# Patient Record
Sex: FEMALE | Race: WHITE | Employment: FULL TIME | URBAN - NONMETROPOLITAN AREA
[De-identification: names, ages, dates, MRNs, and addresses within clinical notes are randomized per-mention and may not be internally consistent; named-entity substitution may affect disease eponyms.]

---

## 2017-11-14 ENCOUNTER — OFFICE VISIT (OUTPATIENT)
Dept: FAMILY MEDICINE CLINIC | Age: 22
End: 2017-11-14
Payer: MEDICAID

## 2017-11-14 VITALS
RESPIRATION RATE: 16 BRPM | BODY MASS INDEX: 23.52 KG/M2 | OXYGEN SATURATION: 99 % | HEART RATE: 92 BPM | SYSTOLIC BLOOD PRESSURE: 124 MMHG | WEIGHT: 132.8 LBS | DIASTOLIC BLOOD PRESSURE: 70 MMHG | TEMPERATURE: 98.1 F

## 2017-11-14 DIAGNOSIS — R30.0 DYSURIA: ICD-10-CM

## 2017-11-14 DIAGNOSIS — T78.40XA ALLERGIC REACTION, INITIAL ENCOUNTER: ICD-10-CM

## 2017-11-14 DIAGNOSIS — N30.01 ACUTE CYSTITIS WITH HEMATURIA: Primary | ICD-10-CM

## 2017-11-14 DIAGNOSIS — K59.00 CONSTIPATION, UNSPECIFIED CONSTIPATION TYPE: ICD-10-CM

## 2017-11-14 LAB
BACTERIA: ABNORMAL /HPF
BILIRUBIN URINE: NEGATIVE
BLOOD, URINE: ABNORMAL
CLARITY: CLEAR
COLOR: ABNORMAL
EPITHELIAL CELLS, UA: ABNORMAL /HPF
GLUCOSE URINE: 100 MG/DL
KETONES, URINE: NEGATIVE MG/DL
LEUKOCYTE ESTERASE, URINE: ABNORMAL
NITRITE, URINE: POSITIVE
PH UA: 5.5
PROTEIN UA: 30 MG/DL
RBC UA: ABNORMAL /HPF (ref 0–2)
SPECIFIC GRAVITY UA: <=1.005
URINE TYPE: ABNORMAL
UROBILINOGEN, URINE: 2 E.U./DL
WBC UA: ABNORMAL /HPF (ref 0–5)

## 2017-11-14 PROCEDURE — G8420 CALC BMI NORM PARAMETERS: HCPCS | Performed by: NURSE PRACTITIONER

## 2017-11-14 PROCEDURE — G8484 FLU IMMUNIZE NO ADMIN: HCPCS | Performed by: NURSE PRACTITIONER

## 2017-11-14 PROCEDURE — G8427 DOCREV CUR MEDS BY ELIG CLIN: HCPCS | Performed by: NURSE PRACTITIONER

## 2017-11-14 PROCEDURE — 99214 OFFICE O/P EST MOD 30 MIN: CPT | Performed by: NURSE PRACTITIONER

## 2017-11-14 PROCEDURE — 1036F TOBACCO NON-USER: CPT | Performed by: NURSE PRACTITIONER

## 2017-11-14 RX ORDER — PHENAZOPYRIDINE HYDROCHLORIDE 200 MG/1
200 TABLET, FILM COATED ORAL 3 TIMES DAILY PRN
Qty: 30 TABLET | Refills: 0 | Status: SHIPPED | OUTPATIENT
Start: 2017-11-14 | End: 2017-11-17

## 2017-11-14 RX ORDER — EPINEPHRINE 0.3 MG/.3ML
0.3 INJECTION SUBCUTANEOUS ONCE
Qty: 0.3 ML | Refills: 5 | Status: SHIPPED | OUTPATIENT
Start: 2017-11-14 | End: 2019-04-02

## 2017-11-14 RX ORDER — SULFAMETHOXAZOLE AND TRIMETHOPRIM 800; 160 MG/1; MG/1
1 TABLET ORAL 2 TIMES DAILY
Qty: 20 TABLET | Refills: 0 | Status: SHIPPED | OUTPATIENT
Start: 2017-11-14 | End: 2017-11-24

## 2017-11-14 RX ORDER — ALBUTEROL SULFATE 90 UG/1
2 AEROSOL, METERED RESPIRATORY (INHALATION) EVERY 6 HOURS PRN
Qty: 1 INHALER | Refills: 0 | Status: SHIPPED | OUTPATIENT
Start: 2017-11-14 | End: 2019-05-30 | Stop reason: ALTCHOICE

## 2017-11-14 ASSESSMENT — ENCOUNTER SYMPTOMS: CONSTIPATION: 1

## 2017-11-14 NOTE — PROGRESS NOTES
Subjective:    Here today for burning  On urination. Patient ID: Trina Chavez is a 23 y.o. female. Chief Complaint   Patient presents with    Urinary Tract Infection     started yesterday. started on antibiotic last night       HPI  Subjective:      Trina Chavez is a 23 y.o. female who complains of dysuria, frequency, urgency, constipation for 1 day. Patient also complains of rhinitis. Patient denies congestion, cough and fever. Patient does have a history of recurrent UTI. Patient does not have a history of pyelonephritis. Patient's medications, allergies, past medical, surgical, social and family histories were reviewed and updated as appropriate. Review of Systems  Pertinent items are noted in HPI. Constipation  Patient complains of constipation. Onset was several years ago. Patient has been having occasional firm stools per week. Defecation has been difficult. Co-Morbid conditions:has changed diest with some improvement. Symptoms have waxed and waned. Current Health Habits: Eating fiber? yes - , Exercise? yes - , Adequate hydration? yes - . Current over the counter/prescription laxative: no which has been not very effective. Objective:      /70 (Site: Right Arm, Position: Sitting, Cuff Size: Medium Adult)   Pulse 92   Temp 98.1 °F (36.7 °C) (Oral)   Resp 16   Wt 132 lb 12.8 oz (60.2 kg)   LMP 11/07/2017 (Approximate)   SpO2 99%   BMI 23.52 kg/m²   General: alert, appears stated age and cooperative   Abdomen: soft, non-tender, without masses or organomegaly in the lower abdomen   Back: back muscles are normal   : defer exam     Laboratory:   Urine dipstick shows see labs.     Orders Only on 11/14/2017   Component Date Value Ref Range Status    Color, UA 11/14/2017 PERRY* Straw/Yellow Final    Clarity, UA 11/14/2017 Clear  Clear Final    Glucose, Ur 11/14/2017 100* Negative mg/dL Final    Bilirubin Urine 11/14/2017 Negative  Negative Final    Ketones, Urine 11/14/2017 tablet    phenazopyridine (PYRIDIUM) 200 MG tablet   2. Dysuria  Urinalysis   3. Allergic reaction, initial encounter  EPINEPHrine (EPIPEN 2-RIVAS) 0.3 MG/0.3ML SOAJ injection    albuterol sulfate HFA (PROAIR HFA) 108 (90 Base) MCG/ACT inhaler   4.  Constipation, unspecified constipation type  linaclotide (LINZESS) 290 MCG CAPS capsule           Plan:    RTC in 10 days to eval urine

## 2017-11-16 LAB
ORGANISM: ABNORMAL
URINE CULTURE, ROUTINE: ABNORMAL
URINE CULTURE, ROUTINE: ABNORMAL

## 2017-11-16 RX ORDER — LEVOFLOXACIN 500 MG/1
500 TABLET, FILM COATED ORAL DAILY
Qty: 7 TABLET | Refills: 0 | Status: SHIPPED | OUTPATIENT
Start: 2017-11-16 | End: 2017-11-23

## 2017-11-27 ENCOUNTER — OFFICE VISIT (OUTPATIENT)
Dept: FAMILY MEDICINE CLINIC | Age: 22
End: 2017-11-27
Payer: MEDICAID

## 2017-11-27 VITALS
OXYGEN SATURATION: 98 % | BODY MASS INDEX: 21.79 KG/M2 | HEART RATE: 104 BPM | WEIGHT: 123 LBS | RESPIRATION RATE: 20 BRPM | DIASTOLIC BLOOD PRESSURE: 76 MMHG | TEMPERATURE: 97.6 F | SYSTOLIC BLOOD PRESSURE: 118 MMHG

## 2017-11-27 DIAGNOSIS — N76.0 BACTERIAL VAGINITIS: Primary | ICD-10-CM

## 2017-11-27 DIAGNOSIS — B96.89 BACTERIAL VAGINITIS: Primary | ICD-10-CM

## 2017-11-27 DIAGNOSIS — Z30.09 FAMILY PLANNING: ICD-10-CM

## 2017-11-27 DIAGNOSIS — N30.01 ACUTE CYSTITIS WITH HEMATURIA: ICD-10-CM

## 2017-11-27 PROCEDURE — 99214 OFFICE O/P EST MOD 30 MIN: CPT | Performed by: NURSE PRACTITIONER

## 2017-11-27 PROCEDURE — G8427 DOCREV CUR MEDS BY ELIG CLIN: HCPCS | Performed by: NURSE PRACTITIONER

## 2017-11-27 PROCEDURE — 1036F TOBACCO NON-USER: CPT | Performed by: NURSE PRACTITIONER

## 2017-11-27 PROCEDURE — G8484 FLU IMMUNIZE NO ADMIN: HCPCS | Performed by: NURSE PRACTITIONER

## 2017-11-27 PROCEDURE — G8420 CALC BMI NORM PARAMETERS: HCPCS | Performed by: NURSE PRACTITIONER

## 2017-11-27 RX ORDER — METRONIDAZOLE 500 MG/1
500 TABLET ORAL 3 TIMES DAILY
Qty: 21 TABLET | Refills: 0 | Status: SHIPPED | OUTPATIENT
Start: 2017-11-27 | End: 2017-12-04

## 2017-11-27 RX ORDER — LEVOFLOXACIN 500 MG/1
500 TABLET, FILM COATED ORAL DAILY
Qty: 7 TABLET | Refills: 0 | Status: SHIPPED | OUTPATIENT
Start: 2017-11-27 | End: 2017-12-04

## 2017-11-27 RX ORDER — NORGESTIMATE AND ETHINYL ESTRADIOL 7DAYSX3 28
1 KIT ORAL DAILY
Qty: 28 TABLET | Refills: 5 | Status: SHIPPED | OUTPATIENT
Start: 2017-11-27 | End: 2019-05-30 | Stop reason: SDUPTHER

## 2017-11-29 DIAGNOSIS — B96.89 BACTERIAL VAGINITIS: ICD-10-CM

## 2017-11-29 DIAGNOSIS — N76.0 BACTERIAL VAGINITIS: ICD-10-CM

## 2017-12-01 LAB
APTIMA MEDIA TYPE: NORMAL
CHLAMYDIA TRACHOMATIS AMPLIFIED DET: NEGATIVE
N GONORRHOEAE AMPLIFIED DET: NEGATIVE
SPECIMEN SOURCE: NORMAL

## 2017-12-12 DIAGNOSIS — N39.0 URINARY TRACT INFECTION WITHOUT HEMATURIA, SITE UNSPECIFIED: Primary | ICD-10-CM

## 2017-12-12 DIAGNOSIS — N39.0 URINARY TRACT INFECTION WITHOUT HEMATURIA, SITE UNSPECIFIED: ICD-10-CM

## 2017-12-12 LAB
BACTERIA: NEGATIVE /HPF
BILIRUBIN URINE: NEGATIVE
BLOOD, URINE: ABNORMAL
CLARITY: CLEAR
COLOR: YELLOW
EPITHELIAL CELLS, UA: 1 /HPF (ref 0–5)
GLUCOSE URINE: NEGATIVE MG/DL
HYALINE CASTS: 1 /HPF (ref 0–8)
KETONES, URINE: NEGATIVE MG/DL
LEUKOCYTE ESTERASE, URINE: NEGATIVE
NITRITE, URINE: NEGATIVE
PH UA: 6
PROTEIN UA: NEGATIVE MG/DL
RBC UA: 4 /HPF (ref 0–4)
SPECIFIC GRAVITY UA: 1.02
UROBILINOGEN, URINE: 0.2 E.U./DL
WBC UA: 1 /HPF (ref 0–5)

## 2017-12-15 ENCOUNTER — OFFICE VISIT (OUTPATIENT)
Dept: FAMILY MEDICINE CLINIC | Age: 22
End: 2017-12-15
Payer: MEDICAID

## 2017-12-15 VITALS
OXYGEN SATURATION: 98 % | HEART RATE: 75 BPM | RESPIRATION RATE: 16 BRPM | DIASTOLIC BLOOD PRESSURE: 64 MMHG | WEIGHT: 130.2 LBS | BODY MASS INDEX: 23.06 KG/M2 | TEMPERATURE: 98.2 F | SYSTOLIC BLOOD PRESSURE: 114 MMHG

## 2017-12-15 DIAGNOSIS — R31.9 HEMATURIA, UNSPECIFIED TYPE: Primary | ICD-10-CM

## 2017-12-15 DIAGNOSIS — K92.1 BLOOD IN STOOL: ICD-10-CM

## 2017-12-15 DIAGNOSIS — R31.9 HEMATURIA, UNSPECIFIED TYPE: ICD-10-CM

## 2017-12-15 LAB
BACTERIA: NEGATIVE /HPF
BILIRUBIN URINE: NEGATIVE
BLOOD, URINE: ABNORMAL
CLARITY: CLEAR
COLOR: YELLOW
EPITHELIAL CELLS, UA: 1 /HPF (ref 0–5)
GLUCOSE URINE: NEGATIVE MG/DL
HYALINE CASTS: 2 /HPF (ref 0–8)
KETONES, URINE: NEGATIVE MG/DL
LEUKOCYTE ESTERASE, URINE: NEGATIVE
NITRITE, URINE: NEGATIVE
PH UA: 7
PROTEIN UA: NEGATIVE MG/DL
RBC UA: 122 /HPF (ref 0–4)
SPECIFIC GRAVITY UA: 1.02
URINE TYPE: ABNORMAL
UROBILINOGEN, URINE: 0.2 E.U./DL
WBC UA: 1 /HPF (ref 0–5)

## 2017-12-15 PROCEDURE — G8420 CALC BMI NORM PARAMETERS: HCPCS | Performed by: NURSE PRACTITIONER

## 2017-12-15 PROCEDURE — 1036F TOBACCO NON-USER: CPT | Performed by: NURSE PRACTITIONER

## 2017-12-15 PROCEDURE — G8484 FLU IMMUNIZE NO ADMIN: HCPCS | Performed by: NURSE PRACTITIONER

## 2017-12-15 PROCEDURE — 99213 OFFICE O/P EST LOW 20 MIN: CPT | Performed by: NURSE PRACTITIONER

## 2017-12-15 PROCEDURE — G8427 DOCREV CUR MEDS BY ELIG CLIN: HCPCS | Performed by: NURSE PRACTITIONER

## 2017-12-15 ASSESSMENT — ENCOUNTER SYMPTOMS
SORE THROAT: 1
BLOOD IN STOOL: 1

## 2017-12-15 NOTE — PROGRESS NOTES
Subjective:      Patient ID: Megan Matta is a 23 y.o. female. Chief Complaint   Patient presents with    Hematuria    Rectal Bleeding     states has a lot of bright red blood when has BM x 2 weeks    Pharyngitis     started this am   pt states has appointment with Dr. Juanis Wilkins in 01/2018    HPI  Pt has recurrent UTI that she sees barbara for. She was recently treated with abx and cleared a UTI, now she has some burning returning. She also reports mild sore throat, no fever. She says that some people at work have sick kids at home and she is afraid they will bring their germs to work. She also reports intermittent rectal bleeding. This doesn't occur with every BM, only sporadically over the last two weeks. No fever, no weight loss, no diarrhea. Review of Systems   HENT: Positive for sore throat. Gastrointestinal: Positive for blood in stool. Genitourinary: Negative for dysuria. No Known Allergies  Current Outpatient Prescriptions on File Prior to Visit   Medication Sig Dispense Refill    Norgestim-Eth Estrad Triphasic (ORTHO TRI-CYCLEN, 28,) 0.18/0.215/0.25 MG-35 MCG TABS Take 1 tablet by mouth daily 28 tablet 5    albuterol sulfate HFA (PROAIR HFA) 108 (90 Base) MCG/ACT inhaler Inhale 2 puffs into the lungs every 6 hours as needed for Wheezing 1 Inhaler 0    EPINEPHrine (EPIPEN 2-RIVAS) 0.3 MG/0.3ML SOAJ injection Inject 0.3 mLs into the muscle once for 1 dose Use as directed for allergic reaction 0.3 mL 5    linaclotide (LINZESS) 290 MCG CAPS capsule Take 1 capsule by mouth every morning (before breakfast) 30 capsule 5     No current facility-administered medications on file prior to visit. No past medical history on file. Objective:   Physical Exam   Constitutional: She is oriented to person, place, and time. She appears well-developed and well-nourished. No distress. HENT:   Head: Normocephalic.    Right Ear: External ear normal.   Left Ear: External ear normal.   Nose: Nose normal.   Mouth/Throat: Oropharynx is clear and moist. No oropharyngeal exudate. Eyes: Conjunctivae are normal. Pupils are equal, round, and reactive to light. Neck: Normal range of motion. Neck supple. Cardiovascular: Normal rate, regular rhythm and normal heart sounds. Pulmonary/Chest: Effort normal and breath sounds normal. No respiratory distress. Neurological: She is alert and oriented to person, place, and time. Skin: Skin is warm and dry. She is not diaphoretic. Psychiatric: Her behavior is normal. Thought content normal.   Nursing note and vitals reviewed. /64 (Site: Right Arm, Position: Sitting, Cuff Size: Medium Adult)   Pulse 75   Temp 98.2 °F (36.8 °C) (Oral)   Resp 16   Wt 130 lb 3.2 oz (59.1 kg)   LMP 11/15/2017   SpO2 98%   BMI 23.06 kg/m²     Assessment:      1. Hematuria, unspecified type  Urinalysis   2. Hematochezia  Culture Stool    Blood Occult Stool Screen #1           Plan:      Keep follow up appt with Michelle next month to address the hematuria. Awaiting urine culture. Stool studies ordered. Refer to GI.

## 2017-12-26 ENCOUNTER — TELEPHONE (OUTPATIENT)
Dept: FAMILY MEDICINE CLINIC | Age: 22
End: 2017-12-26

## 2017-12-26 NOTE — TELEPHONE ENCOUNTER
Patient called on Friday, call was transferred to my voice mail, but I was out of the office. She states sore throat has worsened since seeing Connor Morales and was requesting antibiotics. She has not be swabbed for flu or strep, I have left voice mail requesting she call and set up an appointment if she is still feeling bad.

## 2018-05-21 ENCOUNTER — OFFICE VISIT (OUTPATIENT)
Dept: FAMILY MEDICINE CLINIC | Age: 23
End: 2018-05-21
Payer: MEDICAID

## 2018-05-21 VITALS
OXYGEN SATURATION: 98 % | BODY MASS INDEX: 23 KG/M2 | RESPIRATION RATE: 20 BRPM | SYSTOLIC BLOOD PRESSURE: 118 MMHG | HEART RATE: 80 BPM | TEMPERATURE: 98 F | DIASTOLIC BLOOD PRESSURE: 76 MMHG | WEIGHT: 125 LBS | HEIGHT: 62 IN

## 2018-05-21 DIAGNOSIS — K62.5 RECTAL BLEEDING: Primary | ICD-10-CM

## 2018-05-21 PROCEDURE — G8427 DOCREV CUR MEDS BY ELIG CLIN: HCPCS | Performed by: NURSE PRACTITIONER

## 2018-05-21 PROCEDURE — 99213 OFFICE O/P EST LOW 20 MIN: CPT | Performed by: NURSE PRACTITIONER

## 2018-05-21 PROCEDURE — G8420 CALC BMI NORM PARAMETERS: HCPCS | Performed by: NURSE PRACTITIONER

## 2018-05-21 PROCEDURE — 1036F TOBACCO NON-USER: CPT | Performed by: NURSE PRACTITIONER

## 2018-05-21 ASSESSMENT — ENCOUNTER SYMPTOMS
BLOOD IN STOOL: 1
ABDOMINAL PAIN: 1
HEMATOCHEZIA: 1
CONSTIPATION: 1

## 2018-05-21 ASSESSMENT — PATIENT HEALTH QUESTIONNAIRE - PHQ9
SUM OF ALL RESPONSES TO PHQ QUESTIONS 1-9: 0
2. FEELING DOWN, DEPRESSED OR HOPELESS: 0
1. LITTLE INTEREST OR PLEASURE IN DOING THINGS: 0
SUM OF ALL RESPONSES TO PHQ9 QUESTIONS 1 & 2: 0

## 2018-06-14 ENCOUNTER — OFFICE VISIT (OUTPATIENT)
Dept: GASTROENTEROLOGY | Age: 23
End: 2018-06-14
Payer: MEDICAID

## 2018-06-14 VITALS
DIASTOLIC BLOOD PRESSURE: 82 MMHG | HEIGHT: 62 IN | RESPIRATION RATE: 18 BRPM | BODY MASS INDEX: 23.74 KG/M2 | HEART RATE: 92 BPM | WEIGHT: 129 LBS | SYSTOLIC BLOOD PRESSURE: 122 MMHG

## 2018-06-14 DIAGNOSIS — K62.5 RECTAL BLEEDING: Primary | ICD-10-CM

## 2018-06-14 DIAGNOSIS — K58.1 IRRITABLE BOWEL SYNDROME WITH CONSTIPATION: ICD-10-CM

## 2018-06-14 DIAGNOSIS — R10.30 LOWER ABDOMINAL PAIN: ICD-10-CM

## 2018-06-14 DIAGNOSIS — K62.89 RECTAL PAIN: ICD-10-CM

## 2018-06-14 PROCEDURE — G8427 DOCREV CUR MEDS BY ELIG CLIN: HCPCS | Performed by: NURSE PRACTITIONER

## 2018-06-14 PROCEDURE — 4004F PT TOBACCO SCREEN RCVD TLK: CPT | Performed by: NURSE PRACTITIONER

## 2018-06-14 PROCEDURE — 99214 OFFICE O/P EST MOD 30 MIN: CPT | Performed by: NURSE PRACTITIONER

## 2018-06-14 PROCEDURE — G8420 CALC BMI NORM PARAMETERS: HCPCS | Performed by: NURSE PRACTITIONER

## 2018-06-14 ASSESSMENT — ENCOUNTER SYMPTOMS
CONSTIPATION: 0
BLOOD IN STOOL: 0
COUGH: 0
SHORTNESS OF BREATH: 0
NAUSEA: 0
CHEST TIGHTNESS: 0
ABDOMINAL PAIN: 1
ABDOMINAL DISTENTION: 0
RECTAL PAIN: 0
ANAL BLEEDING: 1
VOMITING: 0
BACK PAIN: 0
VOICE CHANGE: 0
DIARRHEA: 0
SORE THROAT: 0

## 2018-08-15 ENCOUNTER — OFFICE VISIT (OUTPATIENT)
Dept: FAMILY MEDICINE CLINIC | Age: 23
End: 2018-08-15
Payer: MEDICAID

## 2018-08-15 VITALS
WEIGHT: 136 LBS | BODY MASS INDEX: 24.87 KG/M2 | HEART RATE: 70 BPM | RESPIRATION RATE: 16 BRPM | OXYGEN SATURATION: 99 % | SYSTOLIC BLOOD PRESSURE: 134 MMHG | TEMPERATURE: 98.5 F | DIASTOLIC BLOOD PRESSURE: 80 MMHG

## 2018-08-15 DIAGNOSIS — N92.6 ABNORMAL MENSES: ICD-10-CM

## 2018-08-15 DIAGNOSIS — R30.0 DYSURIA: ICD-10-CM

## 2018-08-15 DIAGNOSIS — B36.0 TINEA VERSICOLOR: Primary | ICD-10-CM

## 2018-08-15 LAB
BACTERIA: NEGATIVE /HPF
BILIRUBIN URINE: NEGATIVE
BLOOD, URINE: ABNORMAL
CLARITY: ABNORMAL
COLOR: YELLOW
EPITHELIAL CELLS, UA: 3 /HPF (ref 0–5)
GLUCOSE URINE: NEGATIVE MG/DL
HCG(URINE) PREGNANCY TEST: NEGATIVE
HYALINE CASTS: 2 /HPF (ref 0–8)
KETONES, URINE: ABNORMAL MG/DL
LEUKOCYTE ESTERASE, URINE: NEGATIVE
NITRITE, URINE: NEGATIVE
PH UA: 7
PROTEIN UA: ABNORMAL MG/DL
RBC UA: 178 /HPF (ref 0–4)
SPECIFIC GRAVITY UA: 1.02
URINE TYPE: ABNORMAL
UROBILINOGEN, URINE: 0.2 E.U./DL
WBC UA: 2 /HPF (ref 0–5)

## 2018-08-15 PROCEDURE — 99213 OFFICE O/P EST LOW 20 MIN: CPT | Performed by: NURSE PRACTITIONER

## 2018-08-15 PROCEDURE — G8427 DOCREV CUR MEDS BY ELIG CLIN: HCPCS | Performed by: NURSE PRACTITIONER

## 2018-08-15 PROCEDURE — 1036F TOBACCO NON-USER: CPT | Performed by: NURSE PRACTITIONER

## 2018-08-15 PROCEDURE — G8420 CALC BMI NORM PARAMETERS: HCPCS | Performed by: NURSE PRACTITIONER

## 2018-08-15 RX ORDER — KETOCONAZOLE 20 MG/ML
SHAMPOO TOPICAL
Qty: 100 ML | Refills: 1 | Status: SHIPPED | OUTPATIENT
Start: 2018-08-15 | End: 2019-04-02 | Stop reason: ALTCHOICE

## 2018-08-15 RX ORDER — FLUCONAZOLE 150 MG/1
TABLET ORAL
Qty: 4 TABLET | Refills: 0 | Status: SHIPPED | OUTPATIENT
Start: 2018-08-15 | End: 2019-05-30 | Stop reason: ALTCHOICE

## 2018-08-15 ASSESSMENT — ENCOUNTER SYMPTOMS: COLOR CHANGE: 1

## 2018-08-15 NOTE — PROGRESS NOTES
[address] [address]  Dept: [phone]  Dept Fax: [phone]  Loc: [phone]    Dwight Johnson is a 23 y.o. female who presents today for her medical conditions/complaints as noted below. Dwight Johnson is c/o of Dysuria (x 2 days) and Other (skin discoloration x 2 weeks)        HPI:     HPI  Pt reports discolorations all over her chest arms and abdomen. This started two weeks ago and is getting gworse. Her boyfriend has identical symptoms. She also has intermittent dysuria and spotting while taking birth control. She wants pregnancy test.   She has tried over the counter selsun blue and athlete's foot cream with no results. History reviewed. No pertinent past medical history.    Past Surgical History:   Procedure Laterality Date    ANTERIOR CRUCIATE LIGAMENT REPAIR      rt    TONSILLECTOMY         Family History   Problem Relation Age of Onset    Other Mother         anxiety     Other Father         anger     Colon Cancer Neg Hx     Colon Polyps Neg Hx     Esophageal Cancer Neg Hx     Liver Cancer Neg Hx     Liver Disease Neg Hx     Stomach Cancer Neg Hx     Rectal Cancer Neg Hx        Social History   Substance Use Topics    Smoking status: Former Smoker     Quit date: 8/8/2018    Smokeless tobacco: Never Used    Alcohol use No      Current Outpatient Prescriptions   Medication Sig Dispense Refill    fluconazole (DIFLUCAN) 150 MG tablet Take two tablets once weekly for two weeks 4 tablet 0    ketoconazole (NIZORAL) 2 % shampoo The shampoo is applied to affected areas and is washed off after five minutes 100 mL 1    Norgestim-Eth Estrad Triphasic (ORTHO TRI-CYCLEN, 28,) 0.18/0.215/0.25 MG-35 MCG TABS Take 1 tablet by mouth daily 28 tablet 5    albuterol sulfate HFA (PROAIR HFA) 108 (90 Base) MCG/ACT inhaler Inhale 2 puffs into the lungs every 6 hours as needed for Wheezing 1 Inhaler 0    hydrocortisone (ANUSOL-HC) 2.5 % rectal cream Place rectally 2 times daily. 1 Tube 0    EPINEPHrine (EPIPEN 2-RIVAS) 0.3 MG/0.3ML SOAJ injection Inject 0.3 mLs into the muscle once for 1 dose Use as directed for allergic reaction 0.3 mL 5     No current facility-administered medications for this visit. No Known Allergies    Health Maintenance   Topic Date Due    HIV screen  10/31/2012    Meningococcal (MCV) Vaccine Age 0-22 Years (1 of 1) 10/31/2013    DTaP/Tdap/Td vaccine (1 - Tdap) 10/31/2016    Pneumococcal med risk (1 of 1 - PPSV23) 10/31/2016    Flu vaccine (1) 09/01/2018    Chlamydia screen  11/29/2018       Subjective:      Review of Systems   Genitourinary: Positive for menstrual problem. Skin: Positive for color change. Objective:     Physical Exam   Constitutional: She is oriented to person, place, and time. She appears well-developed and well-nourished. Neurological: She is alert and oriented to person, place, and time. Skin: Skin is warm and dry. Widespread decreased pigmentation lesions on upper chest, bilateral arms and back. Nursing note and vitals reviewed. /80 (Site: Right Arm, Position: Sitting, Cuff Size: Medium Adult)   Pulse 70   Temp 98.5 °F (36.9 °C) (Oral)   Resp 16   Wt 136 lb (61.7 kg)   LMP 07/31/2018 (Approximate)   SpO2 99%   BMI 24.87 kg/m²     Assessment:       Diagnosis Orders   1. Tinea versicolor  ketoconazole (NIZORAL) 2 % shampoo    [facility]- Lynne Zavaleta MD   2. Dysuria  Urinalysis    Urine Culture   3. Abnormal menses  Pregnancy, Urine       Plan:      Orders Placed This Encounter   Procedures    Urine Culture     Standing Status:   Future     Standing Expiration Date:   8/15/2019     Order Specific Question:   Specify (ex-cath, midstream, cysto, etc)?      Answer:   Midstream    Urinalysis     Standing Status:   Future     Number of Occurrences:   1     Standing Expiration Date:   8/15/2019     Order Specific Question:

## 2018-08-15 NOTE — PATIENT INSTRUCTIONS
clothes in very hot water to kill the yeast.  When should you call for help? Call your doctor now or seek immediate medical care if:    · You have signs of infection such as:  ¨ Pain, warmth, or swelling in your skin. ¨ Red streaks near a wound in your skin. ¨ Pus coming from a wound in your skin. ¨ A fever.    Watch closely for changes in your health, and be sure to contact your doctor if:    · Your skin condition does not improve in 2 weeks.     · You do not get better as expected. Where can you learn more? Go to [email or web address] and sign in to your [employer] account. Enter C969 in the [employer] box to learn more about \"Tinea Versicolor: Care Instructions. \"     If you do not have an account, please click on the \"Sign Up Now\" link. Current as of: October 5, 2017  Content Version: 11.7  © 1974-2527 [employer]. Care instructions adapted under license by [facility] ([facility]). If you have questions about a medical condition or this instruction, always ask your healthcare professional. [place] [address] any warranty or liability for your use of this information. Patient Education        Painful Urination (Dysuria): Care Instructions  Your Care Instructions  Burning pain with urination (dysuria) is a common symptom of a urinary tract infection or other urinary problems. The bladder may become inflamed. This can cause pain when the bladder fills and empties. You may also feel pain if the tube that carries urine from the bladder to the outside of the body (urethra) gets irritated or infected. Sexually transmitted infections (STIs) also may cause pain when you urinate. Sometimes the pain can be caused by things other than an infection. The urethra can be irritated by soaps, perfumes, or foreign objects in the urethra. Kidney stones can cause pain when they pass through the urethra. The cause may be hard to find. You may need tests.  Treatment for

## 2019-04-02 ENCOUNTER — OFFICE VISIT (OUTPATIENT)
Dept: FAMILY MEDICINE CLINIC | Age: 24
End: 2019-04-02
Payer: COMMERCIAL

## 2019-04-02 VITALS
DIASTOLIC BLOOD PRESSURE: 72 MMHG | SYSTOLIC BLOOD PRESSURE: 118 MMHG | OXYGEN SATURATION: 98 % | BODY MASS INDEX: 25.79 KG/M2 | TEMPERATURE: 98.2 F | WEIGHT: 141 LBS | HEART RATE: 70 BPM | RESPIRATION RATE: 20 BRPM

## 2019-04-02 DIAGNOSIS — N30.01 ACUTE CYSTITIS WITH HEMATURIA: ICD-10-CM

## 2019-04-02 DIAGNOSIS — R31.0 GROSS HEMATURIA: Primary | ICD-10-CM

## 2019-04-02 DIAGNOSIS — R31.0 GROSS HEMATURIA: ICD-10-CM

## 2019-04-02 DIAGNOSIS — T88.7XXA MEDICATION SIDE EFFECT: ICD-10-CM

## 2019-04-02 LAB
BACTERIA: ABNORMAL /HPF
BILIRUBIN URINE: NEGATIVE
BLOOD, URINE: ABNORMAL
CLARITY: CLEAR
COLOR: YELLOW
EPITHELIAL CELLS, UA: ABNORMAL /HPF
GLUCOSE URINE: NEGATIVE MG/DL
KETONES, URINE: NEGATIVE MG/DL
LEUKOCYTE ESTERASE, URINE: ABNORMAL
NITRITE, URINE: POSITIVE
PH UA: 7 (ref 5–8)
PROTEIN UA: NEGATIVE MG/DL
RBC UA: ABNORMAL /HPF (ref 0–2)
SPECIFIC GRAVITY UA: 1.01 (ref 1–1.03)
URINE REFLEX TO CULTURE: YES
URINE TYPE: ABNORMAL
UROBILINOGEN, URINE: 0.2 E.U./DL
WBC UA: ABNORMAL /HPF (ref 0–5)

## 2019-04-02 PROCEDURE — 99213 OFFICE O/P EST LOW 20 MIN: CPT | Performed by: NURSE PRACTITIONER

## 2019-04-02 RX ORDER — FLUCONAZOLE 150 MG/1
150 TABLET ORAL ONCE
Qty: 1 TABLET | Refills: 0 | Status: SHIPPED | OUTPATIENT
Start: 2019-04-02 | End: 2019-04-02

## 2019-04-02 RX ORDER — SULFAMETHOXAZOLE AND TRIMETHOPRIM 800; 160 MG/1; MG/1
1 TABLET ORAL 2 TIMES DAILY
Qty: 20 TABLET | Refills: 0 | Status: SHIPPED | OUTPATIENT
Start: 2019-04-02 | End: 2019-04-04 | Stop reason: ALTCHOICE

## 2019-04-02 RX ORDER — PHENAZOPYRIDINE HYDROCHLORIDE 200 MG/1
200 TABLET, FILM COATED ORAL 3 TIMES DAILY PRN
Qty: 10 TABLET | Refills: 0 | Status: SHIPPED | OUTPATIENT
Start: 2019-04-02 | End: 2019-04-05

## 2019-04-02 ASSESSMENT — PATIENT HEALTH QUESTIONNAIRE - PHQ9
SUM OF ALL RESPONSES TO PHQ QUESTIONS 1-9: 0
SUM OF ALL RESPONSES TO PHQ QUESTIONS 1-9: 0
SUM OF ALL RESPONSES TO PHQ9 QUESTIONS 1 & 2: 0
1. LITTLE INTEREST OR PLEASURE IN DOING THINGS: 0
2. FEELING DOWN, DEPRESSED OR HOPELESS: 0

## 2019-04-02 NOTE — PROGRESS NOTES
SUBJECTIVE:  Here today for bladder pain  Patient ID: Marcie Abernathy is a 26 y.o. female. HPI:   Dysuria    This is a new problem. The current episode started today. The problem has been gradually worsening. The pain is moderate. Associated symptoms include flank pain and frequency. Pertinent negatives include no hematuria. Treatments tried: AZO. The treatment provided mild relief. Her past medical history is significant for recurrent UTIs. Orders Only on 04/02/2019   Component Date Value Ref Range Status    Color, UA 04/02/2019 Yellow  Straw/Yellow Final    Clarity, UA 04/02/2019 Clear  Clear Final    Glucose, Ur 04/02/2019 Negative  Negative mg/dL Final    Bilirubin Urine 04/02/2019 Negative  Negative Final    Ketones, Urine 04/02/2019 Negative  Negative mg/dL Final    Specific Gravity, UA 04/02/2019 1.010  1.005 - 1.030 Final    Blood, Urine 04/02/2019 MODERATE* Negative Final    pH, UA 04/02/2019 7.0  5.0 - 8.0 Final    Protein, UA 04/02/2019 Negative  Negative mg/dL Final    Urobilinogen, Urine 04/02/2019 0.2  <2.0 E.U./dL Final    Nitrite, Urine 04/02/2019 POSITIVE* Negative Final    Leukocyte Esterase, Urine 04/02/2019 TRACE* Negative Final    Urine Reflex to Culture 04/02/2019 YES   Final    Urine Type 04/02/2019 Clean catch   Final       No past medical history on file. Prior to Visit Medications    Medication Sig Taking?  Authorizing Provider   sulfamethoxazole-trimethoprim (BACTRIM DS) 800-160 MG per tablet Take 1 tablet by mouth 2 times daily for 10 days Yes PADMINI Thomas   phenazopyridine (PYRIDIUM) 200 MG tablet Take 1 tablet by mouth 3 times daily as needed for Pain Yes PADMINI Thomas   fluconazole (DIFLUCAN) 150 MG tablet Take 1 tablet by mouth once for 1 dose Yes PADMINI Thomas   fluconazole (DIFLUCAN) 150 MG tablet Take two tablets once weekly for two weeks Yes PADMINI Vazquez   Norgestim-Eth Estrad Triphasic (ORTHO TRI-CYCLEN, 28,) 0. 18/0.215/0.25 MG-35 MCG TABS Take 1 tablet by mouth daily Yes Di Dorado APRNICK   EPINEPHrine (EPIPEN 2-RIVAS) 0.3 MG/0.3ML SOAJ injection Inject 0.3 mLs into the muscle once for 1 dose Use as directed for allergic reaction Yes PADMINI Garsia   albuterol sulfate HFA (PROAIR HFA) 108 (90 Base) MCG/ACT inhaler Inhale 2 puffs into the lungs every 6 hours as needed for Wheezing  PADMINI Garsia      No Known Allergies    Review of Systems   Constitutional: Positive for fever. Genitourinary: Positive for dysuria, flank pain and frequency. Negative for hematuria. OBJECTIVE:    Physical Exam   Constitutional: She is oriented to person, place, and time. She appears well-developed and well-nourished. HENT:   Head: Normocephalic and atraumatic. Right Ear: External ear normal.   Left Ear: External ear normal.   Eyes: Conjunctivae and lids are normal.   Neck: Normal range of motion. Cardiovascular: Normal rate, regular rhythm and normal heart sounds. Pulmonary/Chest: Effort normal and breath sounds normal.   Abdominal: Soft. Normal appearance and bowel sounds are normal. There is no tenderness. Neurological: She is alert and oriented to person, place, and time. Skin: Skin is warm and dry. Psychiatric: She has a normal mood and affect. Her behavior is normal.      /72 (Site: Left Upper Arm, Position: Sitting, Cuff Size: Medium Adult)   Pulse 70   Temp 98.2 °F (36.8 °C) (Oral)   Resp 20   Wt 141 lb (64 kg)   SpO2 98%   BMI 25.79 kg/m²      ASSESSMENT:      ICD-10-CM    1. Gross hematuria R31.0 Urinalysis Reflex to Culture   2. Acute cystitis with hematuria N30.01 sulfamethoxazole-trimethoprim (BACTRIM DS) 800-160 MG per tablet     phenazopyridine (PYRIDIUM) 200 MG tablet   3. Medication side effect T88. 7XXA fluconazole (DIFLUCAN) 150 MG tablet       PLAN:    Marietta Mensch: Hematuria (Possible UTI )    Review urine culture and make changes if needed  Diagnosis and orders for this visit are above.

## 2019-04-04 LAB
ORGANISM: ABNORMAL
URINE CULTURE, ROUTINE: ABNORMAL
URINE CULTURE, ROUTINE: ABNORMAL

## 2019-04-04 RX ORDER — NITROFURANTOIN 25; 75 MG/1; MG/1
100 CAPSULE ORAL 2 TIMES DAILY
Qty: 14 CAPSULE | Refills: 0 | Status: SHIPPED | OUTPATIENT
Start: 2019-04-04 | End: 2019-04-11

## 2019-04-11 ENCOUNTER — TELEPHONE (OUTPATIENT)
Dept: FAMILY MEDICINE CLINIC | Age: 24
End: 2019-04-11

## 2019-04-11 NOTE — TELEPHONE ENCOUNTER
Robson Coleman, APRNICK 3 hours ago (12:21 PM)         just discomfort right before I take my upcoming dose. .like the longer its been since I took the last pill the more discomfort im experiencing. I've had like 30 UTI's so I don't really consider it pain because its very minute but I normally don't have discomfort related to peeing and I generally stay very well hydrated. I have seen DR. Martinez the urologist in the past for recurring UTI's and blood in my urine but his results were inconclusive. I'm just really willing to do whatever it takes to stop having these issues. I have a family friend who had the issue and was recommended for a procedure that involved pumping her bladder with a solution and hasn't since had any issues. Just looking for a more permanent fix than antibiotics if any. You  Marietta Coleman 23 hours ago (4:06 PM)         Marisa Johns, that would be fine, but I need to know exactly what type symptoms you are having so I can place the order. Once you let me know what symptoms you are still having, I will place the order and let you know! [facility], Barneyviv Crow, Kit Wilburn, APRN Yesterday (12:38 PM)         Should I come by for another urinalysis once I finish this antibiotic to make sure everything is cleared up? Have still been experiencing mild discomfort particularly close to upcoming doses. Sara January if I could just stop by the lab one morning and do it would be best. I have new insurance and am not sure yet how or if they're going to cover any of my office visits. Please let me know if this is something that would be possible.      Much appreciated,   Trina Rondon :)

## 2019-04-12 DIAGNOSIS — N39.0 RECURRENT UTI: Primary | ICD-10-CM

## 2019-04-12 LAB
BACTERIA: NEGATIVE /HPF
BILIRUBIN URINE: NEGATIVE
BLOOD, URINE: ABNORMAL
CLARITY: CLEAR
COLOR: YELLOW
EPITHELIAL CELLS, UA: 0 /HPF (ref 0–5)
GLUCOSE URINE: NEGATIVE MG/DL
HYALINE CASTS: 0 /HPF (ref 0–8)
KETONES, URINE: NEGATIVE MG/DL
LEUKOCYTE ESTERASE, URINE: NEGATIVE
NITRITE, URINE: NEGATIVE
PH UA: 6.5 (ref 5–8)
PROTEIN UA: NEGATIVE MG/DL
RBC UA: 10 /HPF (ref 0–4)
SPECIFIC GRAVITY UA: 1 (ref 1–1.03)
URINE REFLEX TO CULTURE: ABNORMAL
UROBILINOGEN, URINE: 0.2 E.U./DL
WBC UA: 0 /HPF (ref 0–5)

## 2019-04-17 ENCOUNTER — TELEPHONE (OUTPATIENT)
Dept: UROLOGY | Age: 24
End: 2019-04-17

## 2019-04-18 ENCOUNTER — OFFICE VISIT (OUTPATIENT)
Dept: UROLOGY | Age: 24
End: 2019-04-18
Payer: COMMERCIAL

## 2019-04-18 VITALS — WEIGHT: 141 LBS | HEIGHT: 66 IN | BODY MASS INDEX: 22.66 KG/M2 | TEMPERATURE: 98.3 F

## 2019-04-18 DIAGNOSIS — N39.0 RECURRENT UTI: Primary | ICD-10-CM

## 2019-04-18 DIAGNOSIS — R31.29 MICROSCOPIC HEMATURIA: ICD-10-CM

## 2019-04-18 DIAGNOSIS — R10.2 VAGINAL PAIN: ICD-10-CM

## 2019-04-18 DIAGNOSIS — R33.9 INCOMPLETE BLADDER EMPTYING: ICD-10-CM

## 2019-04-18 DIAGNOSIS — N32.89 BLADDER SPASM: ICD-10-CM

## 2019-04-18 LAB
BACTERIA URINE, POC: 0
BILIRUBIN URINE: 0 MG/DL
BLOOD, URINE: POSITIVE
CASTS URINE, POC: 0
CLARITY: CLEAR
COLOR: YELLOW
CRYSTALS URINE, POC: 0
EPI CELLS URINE, POC: 0
GLUCOSE URINE: NEGATIVE
KETONES, URINE: NEGATIVE
LEUKOCYTE EST, POC: NEGATIVE
NITRITE, URINE: NEGATIVE
PH UA: 7 (ref 4.5–8)
PROTEIN UA: NEGATIVE
RBC URINE, POC: 5
SPECIFIC GRAVITY UA: 1.01 (ref 1–1.03)
UROBILINOGEN, URINE: NORMAL
WBC URINE, POC: 0
YEAST URINE, POC: 0

## 2019-04-18 PROCEDURE — 51798 US URINE CAPACITY MEASURE: CPT | Performed by: NURSE PRACTITIONER

## 2019-04-18 PROCEDURE — 99204 OFFICE O/P NEW MOD 45 MIN: CPT | Performed by: NURSE PRACTITIONER

## 2019-04-18 PROCEDURE — 81001 URINALYSIS AUTO W/SCOPE: CPT | Performed by: NURSE PRACTITIONER

## 2019-04-18 RX ORDER — HYOSCYAMINE SULFATE 0.125 MG
125 TABLET,DISINTEGRATING ORAL 3 TIMES DAILY PRN
Qty: 180 TABLET | Refills: 0 | Status: SHIPPED | OUTPATIENT
Start: 2019-04-18

## 2019-04-18 NOTE — PROGRESS NOTES
Pooja Evangelista is a 26 y.o. female who presents today   Chief Complaint   Patient presents with    New Patient     pt here today for recurrent uti            HPI:       Pooja Evangelista presents for evaluation of recurrent UTI. She states that her first UTI was in . She experiences burning, urgency, abdominal pain, back pain. She has chills and fever occasionally with her UTI. She has seen Dr. Manan Jimenez at [facility] previously. She had an Ultrasound completed as well as a 24 hour urine. She did take Macrodantin for around 4-5 months, she did not have any UTIs while on the medication. She is sexually active, she denies intentional anal intercourse. She states that she has had around 30 UTIs in the past 3 years. History reviewed. No pertinent past medical history.    Past Surgical History:   Procedure Laterality Date    ANTERIOR CRUCIATE LIGAMENT REPAIR      rt    TONSILLECTOMY         Family History   Problem Relation Age of Onset    Other Mother         anxiety     Other Father         anger     Colon Cancer Neg Hx     Colon Polyps Neg Hx     Esophageal Cancer Neg Hx     Liver Cancer Neg Hx     Liver Disease Neg Hx     Stomach Cancer Neg Hx     Rectal Cancer Neg Hx        Social History     Tobacco Use    Smoking status: Former Smoker     Last attempt to quit: 2018     Years since quittin.6    Smokeless tobacco: Never Used   Substance Use Topics    Alcohol use: No      Current Outpatient Medications   Medication Sig Dispense Refill    hyoscyamine (OSCIMIN) 125 MCG TBDP dispersible tablet Take 1 tablet by mouth 3 times daily as needed (bladder spasm) 180 tablet 0    Norgestim-Eth Estrad Triphasic (ORTHO TRI-CYCLEN, 28,) 0.18/0.215/0.25 MG-35 MCG TABS Take 1 tablet by mouth daily 28 tablet 5    albuterol sulfate HFA (PROAIR HFA) 108 (90 Base) MCG/ACT inhaler Inhale 2 puffs into the lungs every 6 hours as needed for Wheezing 1 Inhaler 0    fluconazole (DIFLUCAN) 150 MG tablet Take urine, poc 0     casts urine, poc 0     epi cells urine, poc 0     crystals urine, poc 0      No results found for: PSA        Assessment/ Plan       Diagnosis Orders   1. Recurrent UTI  POCT Urinalysis Dipstick w/ Micro (Auto)    FL Measure, post-void residual, US, non-imaging    CT ABDOMEN PELVIS W WO CONTRAST Additional Contrast? None   2. Bladder spasm  CT ABDOMEN PELVIS W WO CONTRAST Additional Contrast? None   3. Incomplete bladder emptying  CT ABDOMEN PELVIS W WO CONTRAST Additional Contrast? None   4. Vaginal pain  Bella Geronimo NP, OB/GYN, [place]   5. Microscopic hematuria  CT ABDOMEN PELVIS W WO CONTRAST Additional Contrast? None   Will have patient complete CT urogram to rule out ureteral stricture and to check for source of hematuria. Hyoscyamine will be used for bladder spasms, hopefully this will help patient empty her bladder easier as she states that she feels like she has to really focused to empty her bladder, postvoid residual today was over 100. Patient is sexually active and has not had a Pap smear in the past; I have referred her to [facility]. She will follow-up with me in 4 weeks to recheck her urine as she just completed antibiotics, at that time we will review CT urogram.     Discussed use, benefit, and side effects of prescribed medications. All patient questions answered. Pt voiced understanding. Patient agreedwith treatment plan.        Electronically signed by PADMINI Vroa on 4/18/2019 at 3:53 PM

## 2019-05-30 ENCOUNTER — OFFICE VISIT (OUTPATIENT)
Dept: FAMILY MEDICINE CLINIC | Age: 24
End: 2019-05-30
Payer: COMMERCIAL

## 2019-05-30 VITALS
HEIGHT: 64 IN | BODY MASS INDEX: 24.14 KG/M2 | TEMPERATURE: 97.5 F | SYSTOLIC BLOOD PRESSURE: 118 MMHG | DIASTOLIC BLOOD PRESSURE: 62 MMHG | WEIGHT: 141.38 LBS | HEART RATE: 56 BPM | OXYGEN SATURATION: 99 % | RESPIRATION RATE: 20 BRPM

## 2019-05-30 DIAGNOSIS — Z30.41 ENCOUNTER FOR SURVEILLANCE OF CONTRACEPTIVE PILLS: Primary | ICD-10-CM

## 2019-05-30 DIAGNOSIS — Z30.41 ENCOUNTER FOR SURVEILLANCE OF CONTRACEPTIVE PILLS: ICD-10-CM

## 2019-05-30 LAB — HCG(URINE) PREGNANCY TEST: NEGATIVE

## 2019-05-30 PROCEDURE — 99213 OFFICE O/P EST LOW 20 MIN: CPT | Performed by: NURSE PRACTITIONER

## 2019-05-30 RX ORDER — NORGESTIMATE AND ETHINYL ESTRADIOL 7DAYSX3 28
1 KIT ORAL DAILY
Qty: 28 TABLET | Refills: 5 | Status: SHIPPED | OUTPATIENT
Start: 2019-05-30 | End: 2019-10-30 | Stop reason: SDUPTHER

## 2019-05-30 NOTE — PROGRESS NOTES
SUBJECTIVE:    Patient ID: Nayla Mckinnon is a25 y.o. female. Nayla Mckinnon is here today for Medication Refill (Patient presents for birth control medication)  . HPI:   HPI   Pt is here for refills on OCP. Less than 1wk without ocp. Pt ran out. She is sexually active and does use protection. Pt reports that she has never had a pap and is advised to schedule. Pt is wanting to restart OCP. Last period was 1.5wks ago and wnl for pt. No abd pain   Pt is an insurance student. History reviewed. No pertinent past medical history. Prior to Visit Medications    Medication Sig Taking?  Authorizing Provider   Norgestim-Eth Estrad Triphasic (ORTHO TRI-CYCLEN, 28,) 0.18/0.215/0.25 MG-35 MCG TABS Take 1 tablet by mouth daily Yes PADMINI Prasad   hyoscyamine (OSCIMIN) 125 MCG TBDP dispersible tablet Take 1 tablet by mouth 3 times daily as needed (bladder spasm) Yes PADMINI Kaplan   EPINEPHrine (EPIPEN 2-RIVAS) 0.3 MG/0.3ML SOAJ injection Inject 0.3 mLs into the muscle once for 1 dose Use as directed for allergic reaction  PADMINI Clancy     No Known Allergies  Past Surgical History:   Procedure Laterality Date    ANTERIOR CRUCIATE LIGAMENT REPAIR      rt    TONSILLECTOMY       Family History   Problem Relation Age of Onset    Other Mother         anxiety     Other Father         anger     Colon Cancer Neg Hx     Colon Polyps Neg Hx     Esophageal Cancer Neg Hx     Liver Cancer Neg Hx     Liver Disease Neg Hx     Stomach Cancer Neg Hx     Rectal Cancer Neg Hx      Social History     Socioeconomic History    Marital status: Single     Spouse name: Not on file    Number of children: Not on file    Years of education: Not on file    Highest education level: Not on file   Occupational History    Not on file   Social Needs    Financial resource strain: Not on file    Food insecurity:     Worry: Not on file     Inability: Not on file    Transportation needs: Medical: Not on file     Non-medical: Not on file   Tobacco Use    Smoking status: Former Smoker     Last attempt to quit: 2018     Years since quittin.8    Smokeless tobacco: Never Used   Substance and Sexual Activity    Alcohol use: No    Drug use: No    Sexual activity: Yes   Lifestyle    Physical activity:     Days per week: Not on file     Minutes per session: Not on file    Stress: Not on file   Relationships    Social connections:     Talks on phone: Not on file     Gets together: Not on file     Attends [employer] service: Not on file     Active member of club or organization: Not on file     Attends meetings of clubs or organizations: Not on file     Relationship status: Not on file    Intimate partner violence:     Fear of current or ex partner: Not on file     Emotionally abused: Not on file     Physically abused: Not on file     Forced sexual activity: Not on file   Other Topics Concern    Not on file   Social History Narrative    Not on file       Review of Systems   Constitutional: Negative for unexpected weight change. Genitourinary: Negative for menstrual problem. OBJECTIVE:    Physical Exam   Constitutional: She is oriented to person, place, and time. She appears well-developed and well-nourished. HENT:   Head: Normocephalic and atraumatic. Eyes: Pupils are equal, round, and reactive to light. Conjunctivae and EOM are normal.   Neck: Neck supple. No tracheal deviation present. Cardiovascular: Normal rate, regular rhythm and normal heart sounds. Pulmonary/Chest: Effort normal and breath sounds normal.   Neurological: She is alert and oriented to person, place, and time. Skin: Skin is warm and dry. Psychiatric: She has a normal mood and affect. Her speech is normal and behavior is normal. Judgment and thought content normal. Her mood appears not anxious. Her affect is not angry. Cognition and memory are normal. She does not exhibit a depressed mood.    Nursing note

## 2019-05-30 NOTE — PATIENT INSTRUCTIONS
Patient Education        Combination Birth Control Pills: Care Instructions  Your Care Instructions    Combination birth control pills are used to prevent pregnancy. They give you a regular dose of the hormones estrogen and progestin. You take a hormone pill every day to prevent pregnancy. Birth control pills come in packs. The most common type has 3 weeks of hormone pills. Some packs have sugar pills (they do not contain any hormones) for the fourth week. During that fourth no-hormone week, you have your period. After the fourth week (28 days), you start a new pack. Some birth control pills are packaged in different ways. For example, some have hormone pills for the fourth week instead of sugar pills. Taking hormones for the entire month causes you to not have periods or to have fewer periods. Others are packaged so that you have a period every 3 months. Your doctor will tell you what type of pills you have. Follow-up care is a key part of your treatment and safety. Be sure to make and go to all appointments, and call your doctor if you are having problems. It's also a good idea to know your test results and keep a list of the medicines you take. How can you care for yourself at home? How do you take the pill? · Follow your doctor's instructions about when to start taking your pills. Use backup birth control, such as a condom, or don't have intercourse for 7 days after you start your pills. · Take your pills every day, at about the same time of day. To help yourself do this, try to take them when you do something else every day, such as brushing your teeth. What if you forget to take a pill? Always read the label for specific instructions, or call your doctor. Here are some basic guidelines:  · If you miss 1 hormone pill, take it as soon as you remember. Ask your doctor if you may need to use a backup birth control method, such as a condom, or not have intercourse.   · If you miss 2 or more hormone or have a sexually transmitted infection. Where can you learn more? Go to https://chpepiceweb.health-partners. org and sign in to your FreshPay account. Enter J330 in the [employer]ing box to learn more about \"Combination Birth Control Pills: Care Instructions. \"     If you do not have an account, please click on the \"Sign Up Now\" link. Current as of: September 5, 2018  Content Version: 12.0  © 3715-3399 Healthwise, [employer]. Care instructions adapted under license by [facility] ([facility]). If you have questions about a medical condition or this instruction, always ask your healthcare professional. [place] [address] any warranty or liability for your use of this information. Patient Education        Safer Sex: Care Instructions  Your Care Instructions  Safer sex is a way to reduce your risk of getting an infection spread through sex. It can also help prevent pregnancy. Most infections that are spread through sex, also called sexually transmitted infections or STIs, can be cured. But some can decrease your chances of getting pregnant if they are not treated early. Others, such as herpes, have no cure. And some, such as HIV, can be deadly. Several products can help you practice safer sex and reduce your chance of STIs. One of the best is a condom. There are condoms for men and for women. The female condom is a tube of soft plastic with a closed end that is placed deep into the vagina. You can use a special rubber sheet (dental dam) for protection during oral sex. Disposable gloves can keep your hands from touching blood, semen, or other body fluids that can carry infections. Remember that birth control methods such as diaphragms, IUDs, foams, and birth control pills do not stop you from getting STIs. Follow-up care is a key part of your treatment and safety. Be sure to make and go to all appointments, and call your doctor if you are having problems.  It's also a good idea to know your test results and keep a list of the medicines you take. How can you care for yourself at home? · Think about getting shots to prevent hepatitis A and hepatitis B. These two diseases can be spread through sex. You also can get hepatitis A if you eat infected food. · Use condoms or female condoms each time and every time you have sex. · Learn the right way to use a male condom:  ? Condoms come in several sizes. Make sure you use the right size. A condom that is too small can break easily. A condom that is too big can slip off during sex. Use a new condom each time you have sex. ? Be careful not to poke a hole in the condom when you open the wrapper. ? Squeeze the tip of the condom to keep out air. ? Pull down the loose skin (foreskin) from the head of an uncircumcised penis. ? While squeezing the tip of the condom, unroll it all the way down to the base of the firm penis. ? Never use petroleum jelly (such as Vaseline), grease, hand lotion, baby oil, or anything with oil in it. These products can make holes in the condom. ? After sex, hold the condom on your penis as you remove your penis from your partner. This will keep semen from spilling out of the condom. · Learn to use a female condom:  ? You can put in a female condom up to 8 hours before sex. ? Squeeze the smaller ring at the closed end and insert it deep into the vagina. The larger ring at the open end should stay outside the vagina. ? During sex, make sure the penis goes into the condom. ? After the penis is removed, close the open end of the condom by twisting it. Remove the condom. · Do not use a female condom and male condom at the same time. · Do not have sex with anyone who has symptoms of an STI, such as sores on the genitals or mouth. The herpes virus that causes cold sores can spread to and from the penis and vagina. · Do not drink a lot of alcohol or use drugs before sex.  This can cause you to let down your guard and not practice safer sex. · Having one sex partner (who does not have STIs and does not have sex with anyone else) is a sure way to avoid STIs. · Talk to your partner before you have sex. Find out if he or she has or is at risk for any STI. Keep in mind that a person may be able to spread an STI even if he or she does not have symptoms. You and your partner may want to get an HIV test. You should get tested again 6 months later. Where can you learn more? Go to https://Loehmann'skizzy.healthTehuti Networkspartners. org and sign in to your [employer] account. Enter H189 in the Accela box to learn more about \"Safer Sex: Care Instructions. \"     If you do not have an account, please click on the \"Sign Up Now\" link. Current as of: September 11, 2018  Content Version: 12.0  © 2606-4405 [employer]. Care instructions adapted under license by [facility] ([facility]). If you have questions about a medical condition or this instruction, always ask your healthcare professional. [address] any warranty or liability for your use of this information. Patient Education        Exposure to Sexually Transmitted Infections: Care Instructions  Your Care Instructions  Sexually transmitted infections (STIs) are those diseases spread by sexual contact. There are at least 20 different STIs, including chlamydia, gonorrhea, syphilis, and human immunodeficiency virus (HIV), which causes AIDS. Bacteria-caused STIs can be treated and cured. STIs caused by viruses, such as HIV, can be treated but not cured. Some STIs can reduce a woman's chances of getting pregnant in the future. STIs are spread during sexual contact, such as vaginal intercourse and oral or anal sex. Follow-up care is a key part of your treatment and safety. Be sure to make and go to all appointments, and call your doctor if you are having problems. It's also a good idea to know your test results and keep a list of the medicines you take.   How can you care for yourself at home? · Your doctor may have given you a shot of antibiotics. If your doctor prescribed antibiotic pills, take them as directed. Do not stop taking them just because you feel better. You need to take the full course of antibiotics. · Do not have sexual contact while you have symptoms of an STI or are being treated for an STI. · Tell your sex partner (or partners) that he or she will need treatment. · If you are a woman, do not douche. Douching changes the normal balance of bacteria in the vagina and may spread an infection up into your reproductive organs. To prevent exposure to STIs in the future  · Use latex condoms every time you have sex. Use them from the beginning to the end of sexual contact. · Talk to your partner before you have sex. Find out if he or she has or is at risk for any STI. Keep in mind that a person may be able to spread an STI even if he or she does not have symptoms. · Do not have sex if you are being treated for an STI. · Do not have sex with anyone who has symptoms of an STI, such as sores on the genitals or mouth. · Having one sex partner (who does not have STIs and does not have sex with anyone else) is a good way to avoid STIs. When should you call for help? Call your doctor now or seek immediate medical care if:    · You have new pain in your belly or pelvis.     · You have symptoms of a urinary tract infection. These may include:  ? Pain or burning when you urinate. ? A frequent need to urinate without being able to pass much urine. ? Pain in the flank, which is just below the rib cage and above the waist on either side of the back. ? Blood in your urine.   ? A fever.     · You have new or worsening pain or swelling in the scrotum.    Watch closely for changes in your health, and be sure to contact your doctor if:    · You have unusual vaginal bleeding.     · You have a discharge from the vagina or penis.     · You have any new symptoms, such as sores, bumps, rashes, blisters, or warts.     · You have itching, tingling, pain, or burning in the genital or anal area.     · You think you may have an STI. Where can you learn more? Go to https://chpeyoeleb.health-partners. org and sign in to your Taulia account. Enter G534 in the Chegue.lÃ¡ box to learn more about \"Exposure to Sexually Transmitted Infections: Care Instructions. \"     If you do not have an account, please click on the \"Sign Up Now\" link. Current as of: September 11, 2018  Content Version: 12.0  © 6921-2862 Healthwise, [employer]. Care instructions adapted under license by [facility] ([facility]). If you have questions about a medical condition or this instruction, always ask your healthcare professional. [address] any warranty or liability for your use of this information.

## 2019-10-30 DIAGNOSIS — Z30.41 ENCOUNTER FOR SURVEILLANCE OF CONTRACEPTIVE PILLS: ICD-10-CM

## 2019-10-31 RX ORDER — NORGESTIMATE AND ETHINYL ESTRADIOL 7DAYSX3 28
KIT ORAL
Qty: 28 TABLET | Refills: 5 | Status: SHIPPED | OUTPATIENT
Start: 2019-10-31

## 2020-12-17 ENCOUNTER — HOSPITAL ENCOUNTER (INPATIENT)
Age: 25
LOS: 1 days | Discharge: HOME | DRG: 179 | End: 2020-12-18
Attending: EMERGENCY MEDICINE | Admitting: INTERNAL MEDICINE
Payer: OTHER GOVERNMENT

## 2020-12-17 ENCOUNTER — APPOINTMENT (OUTPATIENT)
Dept: CT IMAGING | Age: 25
DRG: 179 | End: 2020-12-17
Payer: OTHER GOVERNMENT

## 2020-12-17 PROBLEM — U07.1 COVID-19 VIRUS INFECTION: Status: ACTIVE | Noted: 2020-12-17

## 2020-12-17 LAB
ACETAMINOPHEN LEVEL: <15 UG/ML
ALBUMIN SERPL-MCNC: 5.5 G/DL (ref 3.5–5.2)
ALP BLD-CCNC: 71 U/L (ref 35–104)
ALT SERPL-CCNC: 19 U/L (ref 5–33)
AMPHETAMINE SCREEN, URINE: NEGATIVE
ANION GAP SERPL CALCULATED.3IONS-SCNC: 12 MMOL/L (ref 7–19)
AST SERPL-CCNC: 23 U/L (ref 5–32)
BARBITURATE SCREEN URINE: NEGATIVE
BENZODIAZEPINE SCREEN, URINE: NEGATIVE
BILIRUB SERPL-MCNC: 0.7 MG/DL (ref 0.2–1.2)
BUN BLDV-MCNC: 7 MG/DL (ref 6–20)
CALCIUM SERPL-MCNC: 10 MG/DL (ref 8.6–10)
CANNABINOID SCREEN URINE: POSITIVE
CHLORIDE BLD-SCNC: 100 MMOL/L (ref 98–111)
CO2: 26 MMOL/L (ref 22–29)
COCAINE METABOLITE SCREEN URINE: NEGATIVE
CREAT SERPL-MCNC: 0.7 MG/DL (ref 0.5–0.9)
ETHANOL: <10 MG/DL (ref 0–0.08)
GFR AFRICAN AMERICAN: >59
GFR NON-AFRICAN AMERICAN: >60
GLUCOSE BLD-MCNC: 101 MG/DL (ref 74–109)
HCG QUALITATIVE: NEGATIVE
HCT VFR BLD CALC: 41.1 % (ref 37–47)
HEMOGLOBIN: 14.3 G/DL (ref 12–16)
Lab: ABNORMAL
MCH RBC QN AUTO: 32.4 PG (ref 27–31)
MCHC RBC AUTO-ENTMCNC: 34.8 G/DL (ref 33–37)
MCV RBC AUTO: 93.2 FL (ref 81–99)
OPIATE SCREEN URINE: NEGATIVE
PDW BLD-RTO: 10.9 % (ref 11.5–14.5)
PLATELET # BLD: 323 K/UL (ref 130–400)
PMV BLD AUTO: 9.1 FL (ref 9.4–12.3)
POTASSIUM SERPL-SCNC: 3.6 MMOL/L (ref 3.5–5)
RBC # BLD: 4.41 M/UL (ref 4.2–5.4)
SALICYLATE, SERUM: <3 MG/DL (ref 3–10)
SARS-COV-2, NAAT: DETECTED
SODIUM BLD-SCNC: 138 MMOL/L (ref 136–145)
TOTAL PROTEIN: 8.9 G/DL (ref 6.6–8.7)
TROPONIN: <0.01 NG/ML (ref 0–0.03)
WBC # BLD: 9.1 K/UL (ref 4.8–10.8)

## 2020-12-17 PROCEDURE — 6360000002 HC RX W HCPCS: Performed by: EMERGENCY MEDICINE

## 2020-12-17 PROCEDURE — 99284 EMERGENCY DEPT VISIT MOD MDM: CPT

## 2020-12-17 PROCEDURE — U0002 COVID-19 LAB TEST NON-CDC: HCPCS

## 2020-12-17 PROCEDURE — 96372 THER/PROPH/DIAG INJ SC/IM: CPT

## 2020-12-17 PROCEDURE — 36415 COLL VENOUS BLD VENIPUNCTURE: CPT

## 2020-12-17 PROCEDURE — G0480 DRUG TEST DEF 1-7 CLASSES: HCPCS

## 2020-12-17 PROCEDURE — 70450 CT HEAD/BRAIN W/O DYE: CPT

## 2020-12-17 PROCEDURE — 1210000000 HC MED SURG R&B

## 2020-12-17 PROCEDURE — 80307 DRUG TEST PRSMV CHEM ANLYZR: CPT

## 2020-12-17 PROCEDURE — 93005 ELECTROCARDIOGRAM TRACING: CPT

## 2020-12-17 PROCEDURE — 84484 ASSAY OF TROPONIN QUANT: CPT

## 2020-12-17 PROCEDURE — 85027 COMPLETE CBC AUTOMATED: CPT

## 2020-12-17 PROCEDURE — 99999 PR OFFICE/OUTPT VISIT,PROCEDURE ONLY: CPT | Performed by: EMERGENCY MEDICINE

## 2020-12-17 PROCEDURE — 80053 COMPREHEN METABOLIC PANEL: CPT

## 2020-12-17 PROCEDURE — 84703 CHORIONIC GONADOTROPIN ASSAY: CPT

## 2020-12-17 RX ORDER — LORAZEPAM 2 MG/ML
2 INJECTION INTRAMUSCULAR ONCE
Status: COMPLETED | OUTPATIENT
Start: 2020-12-17 | End: 2020-12-17

## 2020-12-17 RX ORDER — ZINC SULFATE 50(220)MG
50 CAPSULE ORAL DAILY
Status: DISCONTINUED | OUTPATIENT
Start: 2020-12-17 | End: 2020-12-18 | Stop reason: HOSPADM

## 2020-12-17 RX ORDER — HALOPERIDOL 5 MG/ML
2 INJECTION INTRAMUSCULAR EVERY 6 HOURS PRN
Status: DISCONTINUED | OUTPATIENT
Start: 2020-12-17 | End: 2020-12-18 | Stop reason: HOSPADM

## 2020-12-17 RX ORDER — PROMETHAZINE HYDROCHLORIDE 12.5 MG/1
12.5 TABLET ORAL EVERY 6 HOURS PRN
Status: DISCONTINUED | OUTPATIENT
Start: 2020-12-17 | End: 2020-12-18 | Stop reason: HOSPADM

## 2020-12-17 RX ORDER — ONDANSETRON 2 MG/ML
4 INJECTION INTRAMUSCULAR; INTRAVENOUS EVERY 6 HOURS PRN
Status: DISCONTINUED | OUTPATIENT
Start: 2020-12-17 | End: 2020-12-18 | Stop reason: HOSPADM

## 2020-12-17 RX ORDER — POTASSIUM CHLORIDE 20 MEQ/1
40 TABLET, EXTENDED RELEASE ORAL PRN
Status: DISCONTINUED | OUTPATIENT
Start: 2020-12-17 | End: 2020-12-18 | Stop reason: HOSPADM

## 2020-12-17 RX ORDER — POTASSIUM CHLORIDE 7.45 MG/ML
10 INJECTION INTRAVENOUS PRN
Status: DISCONTINUED | OUTPATIENT
Start: 2020-12-17 | End: 2020-12-18 | Stop reason: HOSPADM

## 2020-12-17 RX ORDER — SODIUM CHLORIDE 0.9 % (FLUSH) 0.9 %
10 SYRINGE (ML) INJECTION PRN
Status: DISCONTINUED | OUTPATIENT
Start: 2020-12-17 | End: 2020-12-18 | Stop reason: HOSPADM

## 2020-12-17 RX ORDER — SODIUM CHLORIDE 0.9 % (FLUSH) 0.9 %
10 SYRINGE (ML) INJECTION EVERY 12 HOURS SCHEDULED
Status: DISCONTINUED | OUTPATIENT
Start: 2020-12-17 | End: 2020-12-18 | Stop reason: HOSPADM

## 2020-12-17 RX ORDER — ASCORBIC ACID 500 MG
1000 TABLET ORAL DAILY
Status: DISCONTINUED | OUTPATIENT
Start: 2020-12-17 | End: 2020-12-18 | Stop reason: HOSPADM

## 2020-12-17 RX ORDER — VITAMIN B COMPLEX
2000 TABLET ORAL DAILY
Status: DISCONTINUED | OUTPATIENT
Start: 2020-12-18 | End: 2020-12-18 | Stop reason: HOSPADM

## 2020-12-17 RX ORDER — HALOPERIDOL 5 MG/ML
5 INJECTION INTRAMUSCULAR ONCE
Status: COMPLETED | OUTPATIENT
Start: 2020-12-17 | End: 2020-12-17

## 2020-12-17 RX ADMIN — LORAZEPAM 2 MG: 2 INJECTION INTRAMUSCULAR; INTRAVENOUS at 10:28

## 2020-12-17 RX ADMIN — HALOPERIDOL LACTATE 5 MG: 5 INJECTION, SOLUTION INTRAMUSCULAR at 12:19

## 2020-12-17 NOTE — ED PROVIDER NOTES
[address] [facility]  eMERGENCY dEPARTMENT eNCOUnter      Pt Name: Shamika Medrano  MRN: [id number]  [place] 1995  Date of evaluation: 12/17/2020  Provider: Mark Anthony Medel MD    CHIEF COMPLAINT       Chief Complaint   Patient presents with    Mental Health Problem         HISTORY OF PRESENT ILLNESS   (Location/Symptom, Timing/Onset,Context/Setting, Quality, Duration, Modifying Factors, Severity)  Note limiting factors. Shamika Medrano is a 23 y.o. female who presents to the emergency department for extremity behavior. Report from EMS is that patient went on a trip to [place] recently with family and ever since she returned from a trip she is been acting very strange. They think that she may have used illicit drugs while there because this all started after the trip to [facility] [place] [facility] [place]. Patient tells me that she used legal marijuana there and that she also smoked a joint with a stranger on the street but denies any other ingestions. She is acting very odd. Has no complaints but hard to get any history from her as her behavior at this point is very erratic and she has a very strange affect. No complaint of HI or SI but again, very difficult to get any history from the patient. It seems very clear the patient's not competent to make decisions for herself at this time so she was placed on 72-hour hold upon her arrival.    HPI    NursingNotes were reviewed. REVIEW OF SYSTEMS    (2-9 systems for level 4, 10 or more for level 5)     Review of Systems   Unable to perform ROS: Mental status change       A complete review of systems was performed and is negative except as noted above in the HPI. PAST MEDICAL HISTORY   History reviewed. No pertinent past medical history.       SURGICAL HISTORY       Past Surgical History:   Procedure Laterality Date    ANTERIOR CRUCIATE LIGAMENT REPAIR      rt    TONSILLECTOMY           CURRENT MEDICATIONS       Previous Medications    EPINEPHRINE (EPIPEN 2-RIVAS) 0.3 MG/0.3ML SOAJ INJECTION    Inject 0.3 mLs into the muscle once for 1 dose Use as directed for allergic reaction    HYOSCYAMINE (OSCIMIN) 125 MCG TBDP DISPERSIBLE TABLET    Take 1 tablet by mouth 3 times daily as needed (bladder spasm)    TRI-SPRINTEC 0.18/0.215/0.25 MG-35 MCG TABS    TAKE 1 TABLET BY MOUTH ONCE DAILY       ALLERGIES     Patient has no known allergies.     FAMILY HISTORY       Family History   Problem Relation Age of Onset    Other Mother         anxiety     Other Father         anger     Colon Cancer Neg Hx     Colon Polyps Neg Hx     Esophageal Cancer Neg Hx     Liver Cancer Neg Hx     Liver Disease Neg Hx     Stomach Cancer Neg Hx     Rectal Cancer Neg Hx           SOCIAL HISTORY       Social History     Socioeconomic History    Marital status: Single     Spouse name: None    Number of children: None    Years of education: None    Highest education level: None   Occupational History    None   Social Needs    Financial resource strain: None    Food insecurity     Worry: None     Inability: None    Transportation needs     Medical: None     Non-medical: None   Tobacco Use    Smoking status: Former Smoker     Quit date: 2018     Years since quittin.3    Smokeless tobacco: Never Used   Substance and Sexual Activity    Alcohol use: No    Drug use: No    Sexual activity: Yes   Lifestyle    Physical activity     Days per week: None     Minutes per session: None    Stress: None   Relationships    Social connections     Talks on phone: None     Gets together: None     Attends Zoroastrian service: None     Active member of club or organization: None     Attends meetings of clubs or organizations: None     Relationship status: None    Intimate partner violence     Fear of current or ex partner: None     Emotionally abused: None     Physically abused: None     Forced sexual activity: None   Other Topics Concern    None   Social History Narrative    None       SCREENINGS PHYSICAL EXAM    (up to 7 for level 4, 8 or more for level 5)     ED Triage Vitals [12/17/20 0958]   BP Temp Temp Source Pulse Resp SpO2 Height Weight   (!) 173/107 97.8 °F (36.6 °C) Oral 96 19 100 % -- --       Physical Exam  Vitals signs reviewed. Constitutional:       General: She is not in acute distress. Appearance: She is well-developed. HENT:      Head: Normocephalic and atraumatic. Eyes:      General: No scleral icterus. Pupils: Pupils are equal, round, and reactive to light. Neck:      Musculoskeletal: Normal range of motion and neck supple. Vascular: No JVD. Cardiovascular:      Rate and Rhythm: Normal rate and regular rhythm. Heart sounds: Normal heart sounds. Pulmonary:      Effort: Pulmonary effort is normal. No respiratory distress. Breath sounds: Normal breath sounds. Abdominal:      General: There is no distension. Palpations: Abdomen is soft. Tenderness: There is no abdominal tenderness. Musculoskeletal:         General: No tenderness. Skin:     General: Skin is warm and dry. Neurological:      General: No focal deficit present. Mental Status: She is alert and oriented to person, place, and time. Sensory: Sensation is intact. Motor: Motor function is intact. Comments: Limited exam but no focal deficit   Psychiatric:         Attention and Perception: She is inattentive. Mood and Affect: Mood is anxious. Affect is labile and inappropriate. Speech: Speech is rapid and pressured and tangential.         Behavior: Behavior is uncooperative, agitated and hyperactive. Thought Content: Thought content is paranoid. Judgment: Judgment is impulsive. DIAGNOSTIC RESULTS     EKG: All EKG's are interpreted by the Emergency Department Physician who either signs or Co-signs this chart in the absence of a cardiologist.    Normal sinus rhythm. Normal QT. Borderline T wave changes anteriorly.   No Dr. Maicol Kamara, agreeable plan of care and admission. Patient currently resting comfortably. CONSULTS:  IP CONSULT TO PSYCHIATRY    PROCEDURES:  Unless otherwise notedbelow, none     Procedures    FINAL IMPRESSION     1. COVID-19    2.  Psychosis, unspecified psychosis type [facility])          DISPOSITION/PLAN   DISPOSITION Decision To Admit 12/17/2020 12:29:48 PM      PATIENT REFERRED TO:  @FUP@    DISCHARGE MEDICATIONS:  New Prescriptions    No medications on file          (Please note that portions of this note were completed with a voice recognition program.  Efforts were made to edit the dictations butoccasionally words are mis-transcribed.)    Edmar Rogers MD (electronically signed)  AttendingEmergency Physician         Edmar Rogers MD  12/17/20 0149
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
## 2020-12-17 NOTE — ED NOTES
Per pt mom, Saturday was pt in [place] with family, and had a glass of wine at dinner and went to her room with her sister. Pt sister states pt drank a fifth of patron when she got back to the room. Starting Sunday pt began acting very agitated and screaming at people asking her to pull her mask up. Per mom pt has had a lot of anxiety regarding restrictions imposed by COVID for awhile. Pt got better Sunday afternoon, went to he dispensary and smoked 3 joints Sunday night. They all flew back Monday and driven seprately from the airport. Pt got lost driving home and called her boyfriend who she lives with, was acting paranoid on the phone that morning about the cars coming at her. Pt boyfriend called family for help Monday evening with pt. Pt jumped out of the window of the car, kept saying she couldn't breathe, uncharacteristic behavior for her. Pt hasnt slept since Sat per Mom. This morning mom decided she needed a medical eval, no meds on a daily basis and no significant medical hx or mental disorders. Pt also has not really eaten since Saturday night. Pt has been taking her clothes off in front of everyone several time in the last few days, describes being hot, then cold.   Per mom pt has not been able to take anything drug wise since she has been with them Monday night     Roxie Caballero RN  12/17/20 5149

## 2020-12-17 NOTE — ED NOTES
Pt mother, Faisal Giraldo [phone].       [address] [place] [address], RN  12/17/20 1010

## 2020-12-17 NOTE — H&P
[facility]3]ists      History & Physical    10/10  PCP: PADMINI Burt    Date of Admission:12/17/2020    Patient:  Liu Etienne  MRN: [id number]    Date of Service: Pt seen/examined on 12/17/2020 and Admitted to Inpatient with expected LOS greater than two midnights due to medical therapy. CHIEF COMPLAINT:    Chief Complaint   Patient presents with    Mental Health Problem       History Obtained From:  electronic medical record  Primary Care Physician: PADMINI Burt    HISTORY OF PRESENT ILLNESS:    History limited, given patient's mental status  Ms Marzena East, a 23 y.o. female brought to the ER by EMS on account of behavioral change \"acting strange\", after turning from a recent trip to [facility] with her family. Patient reports inability to sleep, and reportedly gets lightheaded and numb each time she tries to sleep. Patient was noted to be impulsive, agitated, hyperactive, paranoid, with rapid, pressured and tangential speech, upon presentation to the ED. As per patient's family (mother) patient was reportedly in [facility] with family on Saturday (12/12/2020). Patient reportedly had excessive alcoholic intake, and subsequently started acting agitated and screaming at people, the next morning on Sunday. Patient reportedly proved by Sunday afternoon. The family reportedly returned on Monday, 12/14/2020. Patient reportedly got lost while driving home from the airport. Patient reportedly continues to report that she could not breathe. Patient has reportedly not slept since Saturday, 12/12/2020, as per patient's mom. Some of the reported bizarre uncharacteristic behaviors include; patient jumping out of the window of the car, as well as reportedly taking off her clothes in front of everyone on several occasions in the last few days, because she reportedly was feeling hot then cold.     Patient was deemed not competent to make decisions for self after evaluation by of ER  Physical Exam  Vitals signs and nursing note reviewed. Constitutional:       General: She is not in acute distress. Appearance: She is not ill-appearing, toxic-appearing or diaphoretic. HENT:      Head: Normocephalic and atraumatic. Nose: Nose normal.   Neck:      Musculoskeletal: Normal range of motion and neck supple. No neck rigidity. Pulmonary:      Effort: Pulmonary effort is normal. No respiratory distress. Comments: Patient no respiratory distress. Currently on room air. Neurological:      Mental Status: She is alert. She is disoriented. Comments: Patient continues to be somewhat confused and not answering questions appropriately. DIAGNOSTIC STUDIES:    I have reviewedLaboratory and Imaging data report today. Recent Labs     12/17/20  1008   WBC 9.1   HGB 14.3        Recent Labs     12/17/20  1008      K 3.6      CO2 26   BUN 7   CREATININE 0.7   GLUCOSE 101   AST 23   ALT 19   BILITOT 0.7   ALKPHOS 71     Troponin T:   Recent Labs     12/17/20  1008   TROPONINI <0.01     Pro-BNP: No results found for: BNP  Lactate: No results found for: LACTA      ABGs: No results found for: PHART, PO2ART, [id number]  INR: No results for input(s): INR in the last 72 hours. TSH:   Lab Results   Component Value Date    TSH 1.22 03/29/2016     URINALYSIS:No results for input(s): NITRITE, COLORU, PHUR, LABCAST, WBCUA, RBCUA, MUCUS, TRICHOMONAS, YEAST, BACTERIA, CLARITYU, SPECGRAV, LEUKOCYTESUR, UROBILINOGEN, BILIRUBINUR, BLOODU, GLUCOSEU, AMORPHOUS in the last 72 hours. Invalid input(s): Emilee Loving / IMAGING REPORTS:     Ct Head Wo Contrast    Result Date: 12/17/2020  Examination. CT HEAD WO CONTRAST 12/17/2020 10:09 AM History: Altered mental status. DLP: 710 mGycm. The CT scan of the head is performed without intravenous contrast enhancement.  The images are acquired in axial plane with subsequent reconstruction in coronal and sagittal with this patient, time spent reviewing medical records, and in coordination of care with the emergency department physician, nursing staff, in the examination, evaluation/assessment, counseling, review of medications and plan, was more than 50 mins . Electronically signed by   Blaise Chapa MD, MPH  Internal Medicine Hospitalist   12/17/2020 5:02 PM      EMR Dragon/Transcription disclaimer:   Much of this encounter note is an electronic transcription/translation of spoken language to printed text.  The electronic translation of spoken language may permit erroneous, or at times, nonsensical words or phrases to be inadvertently transcribed; although attempts have made to review the note for such errors, some may still exist.

## 2020-12-18 VITALS
RESPIRATION RATE: 18 BRPM | TEMPERATURE: 98.4 F | HEART RATE: 114 BPM | OXYGEN SATURATION: 100 % | SYSTOLIC BLOOD PRESSURE: 144 MMHG | DIASTOLIC BLOOD PRESSURE: 101 MMHG

## 2020-12-18 PROCEDURE — 99253 IP/OBS CNSLTJ NEW/EST LOW 45: CPT | Performed by: PSYCHIATRY & NEUROLOGY

## 2020-12-18 PROCEDURE — 6370000000 HC RX 637 (ALT 250 FOR IP): Performed by: INTERNAL MEDICINE

## 2020-12-18 RX ORDER — CHOLECALCIFEROL (VITAMIN D3) 50 MCG
2000 TABLET ORAL DAILY
Qty: 30 TABLET | Refills: 0 | Status: SHIPPED | OUTPATIENT
Start: 2020-12-19 | End: 2021-01-18

## 2020-12-18 RX ORDER — ZINC SULFATE 50(220)MG
50 CAPSULE ORAL DAILY
Qty: 30 CAPSULE | Refills: 0 | Status: SHIPPED | OUTPATIENT
Start: 2020-12-19

## 2020-12-18 RX ADMIN — ZINC SULFATE 220 MG (50 MG) CAPSULE 50 MG: CAPSULE at 08:43

## 2020-12-18 RX ADMIN — OXYCODONE HYDROCHLORIDE AND ACETAMINOPHEN 1000 MG: 500 TABLET ORAL at 08:43

## 2020-12-18 RX ADMIN — Medication 2000 UNITS: at 08:43

## 2020-12-18 NOTE — PROGRESS NOTES
[facility] Hospitalists Progress Note    Patient:  Bijal Sharp  YOB: 1995  Date of Service: 12/18/2020  MRN: [id number]   Acct: [de-identified]   Primary Care Physician: PADMINI Esparza  Advance Directive: Full Code  Admit Date: 12/17/2020       Hospital Day: 1    *** NOTE: Portions of this note have been copied forward, however, changes made to reflect the most current clinical status of this patient. CHIEF COMPLAINT:  ***   Chief Complaint   Patient presents with   [address] Problem       12/18/2020 9:07 AM  Subjective / Interval History:   12/18/2020  ***      Brief History:   Ms Driss Corrales, a 23 y.o. female brought to the ER by EMS on account of behavioral change \"acting strange\", after turning from a recent trip to [facility] with her family.     Patient reports inability to sleep, and reportedly gets lightheaded and numb each time she tries to sleep.     Patient was noted to be impulsive, agitated, hyperactive, paranoid, with rapid, pressured and tangential speech, upon presentation to the ED.      As per patient's family (mother) patient was reportedly in [facility] with family on Saturday (12/12/2020). Patient reportedly had excessive alcoholic intake, and subsequently started acting agitated and screaming at people, the next morning on Sunday. Patient reportedly proved by Sunday afternoon. The family reportedly returned on Monday, 12/14/2020. Patient reportedly got lost while driving home from the airport.     Patient reportedly continues to report that she could not breathe.   Patient has reportedly not slept since Saturday, 12/12/2020, as per patient's mom.     Some of the reported bizarre uncharacteristic behaviors include; patient jumping out of the window of the car, as well as reportedly taking off her clothes in front of everyone on several occasions in the last few days, because she reportedly was feeling hot then cold.    Patient was deemed not competent to make decisions for self after evaluation by behavioral health and was placed on a 72-hour hold with plan for admission to inpatient psych for further work-up and management.     Subsequent preadmission COVID-19 test screening reported positive.     Patient has been admitted to the Covid unit for further work-up and management. Review of Systems:   Review of Systems  ***ROS: 14 point review of systems is negative except as specifically addressed above. DIET CARDIAC;   No intake or output data in the 24 hours ending 12/18/20 0907    Medications:  Current Facility-Administered Medications   Medication Dose Route Frequency Provider Last Rate Last Admin    sodium chloride flush 0.9 % injection 10 mL  10 mL Intravenous 2 times per day Drinda Romberg, MD        sodium chloride flush 0.9 % injection 10 mL  10 mL Intravenous PRN Drinda Romberg, MD        potassium chloride (KLOR-CON M) extended release tablet 40 mEq  40 mEq Oral PRN Drinda Romberg, MD        Or    potassium bicarb-citric acid (EFFER-K) effervescent tablet 40 mEq  40 mEq Oral PRN Drinda Romberg, MD        Or    potassium chloride 10 mEq/100 mL IVPB (Peripheral Line)  10 mEq Intravenous PRN Drinda Romberg, MD        promethazine (PHENERGAN) tablet 12.5 mg  12.5 mg Oral Q6H PRN Drinda Romberg, MD        Or    ondansetron [facility]) injection 4 mg  4 mg Intravenous Q6H PRN Drinda Romberg, MD        magnesium hydroxide (MILK OF MAGNESIA) 400 MG/5ML suspension 30 mL  30 mL Oral Daily PRN Drinda Romberg, MD        enoxaparin (LOVENOX) injection 30 mg  30 mg Subcutaneous BID Drinda Romberg, MD        Vitamin D (CHOLECALCIFEROL) tablet 2,000 Units  2,000 Units Oral Daily Drinda Romberg, MD   2,000 Units at 12/18/20 0879    zinc sulfate (ZINCATE) capsule 50 mg  50 mg Oral Daily Drinda Romberg, MD   50 mg at 12/18/20 5886  ascorbic acid (VITAMIN C) tablet 1,000 mg  1,000 mg Oral Daily Jonathon Metzger MD   1,000 mg at 12/18/20 7905    haloperidol lactate (HALDOL) injection 2 mg  2 mg Intramuscular Q6H PRN Tito Dunn PA-C               sodium chloride flush  10 mL Intravenous 2 times per day    enoxaparin  30 mg Subcutaneous BID    Vitamin D  2,000 Units Oral Daily    zinc sulfate  50 mg Oral Daily    vitamin C  1,000 mg Oral Daily     sodium chloride flush, potassium chloride **OR** potassium alternative oral replacement **OR** potassium chloride, promethazine **OR** ondansetron, magnesium hydroxide, haloperidol lactate  DIET CARDIAC;       Labs:   CBC with DIFF:  Recent Labs     12/17/20  1008   WBC 9.1   RBC 4.41   HGB 14.3   HCT 41.1   MCV 93.2   MCH 32.4*   MCHC 34.8   RDW 10.9*      MPV 9.1*       CMP/BMP:  Recent Labs     12/17/20  1008      K 3.6      CO2 26   ANIONGAP 12   GLUCOSE 101   BUN 7   CREATININE 0.7   LABGLOM >60   CALCIUM 10.0   PROT 8.9*   LABALBU 5.5*   BILITOT 0.7   ALKPHOS 71   ALT 19   AST 23         CRP:  No results for input(s): CRP in the last 72 hours. Sed Rate:  No results for input(s): SEDRATE in the last 72 hours. HgBA1c:  No components found for: HGBA1C  FLP:  No results found for: TRIG, HDL, LDLCALC, LDLDIRECT, LABVLDL  TSH:    Lab Results   Component Value Date    TSH 1.22 03/29/2016     Troponin T:   Recent Labs     12/17/20  1008   TROPONINI <0.01     Pro-BNP: No results for input(s): BNP in the last 72 hours. INR: No results for input(s): INR in the last 72 hours. ABGs: No results found for: PHART, PO2ART, TVM8TLP  UA:No results for input(s): NITRITE, COLORU, PHUR, LABCAST, WBCUA, RBCUA, MUCUS, TRICHOMONAS, YEAST, BACTERIA, CLARITYU, SPECGRAV, LEUKOCYTESUR, UROBILINOGEN, BILIRUBINUR, BLOODU, GLUCOSEU, AMORPHOUS in the last 72 hours. Invalid input(s): Euel Sinks      Culture Results:    No results for input(s): CXSURG in the last 720 hours. Blood Culture Recent: No results for input(s): BC in the last 720 hours. Cultures:   No results for input(s): CULTURE in the last 72 hours. No results for input(s): BC, Delfino Marlene in the last 72 hours. No results for input(s): CXSURG in the last 72 hours. No results for input(s): MG, PHOS in the last 72 hours. Recent Labs     12/17/20  1008   AST 23   ALT 19   BILITOT 0.7   ALKPHOS 71         RAD:   Ct Head Wo Contrast    Result Date: 12/17/2020  Examination. CT HEAD WO CONTRAST 12/17/2020 10:09 AM History: Altered mental status. DLP: 710 mGycm. The CT scan of the head is performed without intravenous contrast enhancement. The images are acquired in axial plane with subsequent reconstruction in coronal and sagittal planes. There is no previous study for comparison. There is no evidence of a mass. No midline shift. No intracranial hemorrhage or hematoma. The ventricles, the basal cisterns and the cortical sulci are normal. There is normal gray-white matter differentiation. The images reviewed in bone window show no acute bony abnormality. There is a mucous retention cyst located posteriorly in the left sphenoid sinus. The remaining paranasal sinuses and mastoid air cells are clear. A negative unenhanced CT scan of the head.  Signed by Dr Olympia Lesches on 12/17/2020 12:19 PM          Objective:   Vitals:   BP (!) 140/90   Pulse 110   Temp 97.3 °F (36.3 °C) (Temporal)   Resp 18   SpO2 98%     ***  Patient Vitals for the past 24 hrs:   BP Temp Temp src Pulse Resp SpO2   12/17/20 2057 (!) 140/90 97.3 °F (36.3 °C) Temporal 110 18 98 %   12/17/20 1456 139/83   86 15 99 %   12/17/20 0958 (!) 173/107 97.8 °F (36.6 °C) Oral 96 19 100 %       24HR INTAKE/OUTPUT:  No intake or output data in the 24 hours ending 12/18/20 9702    Physical Exam  Nursing notes and vital signs reviewed***      Assessment/plan:     Patient Active Problem List    Diagnosis Date Noted    COVID-19 virus infection 12/17/2020  Bladder spasm 04/18/2019    Incomplete bladder emptying 04/18/2019    Microscopic hematuria 05/03/2017    Recurrent UTI 05/03/2017       Hospital Problems           Last Modified POA    COVID-19 virus infection 12/17/2020 Yes          Active Problems:    COVID-19 virus infection  Resolved Problems:    * No resolved hospital problems. *      Altered mental status  Paranoid  COVID 19 Infection - Diagnosed on 12/17/2020  · CT head without contrast, with no acute intracranial pathology  · *** D-dimer, serum ferritin, vitamin D levels. · Currently with no oxygen requirement. · Adjunct therapy with;  Melatonin, Vitamin D, Zinc,   · Avoid Nebulizer treatments to avoid aerosolization.    · Encourage Prone positioning  · Enoxaparin subcu  · Continue supportive management for COVID-19  · Psych consulted from ER  · Further management as per psych recommendation.        Continue management of other chronic medical conditions - see above and orders. Advance Directive: Full Code    DIET CARDIAC;         Consults Made:   IP CONSULT TO SOCIAL WORK  PALLIATIVE CARE EVAL  IP CONSULT TO PSYCHIATRY    DVT prophylaxis: Enoxaparin***      Discharge planning: tbd***    Time Spent is {Time; 15 min - 8 hours:76986} in the examination, evaluation, counseling and review of medications, assessment and plan.      Electronically signed by   Gris Patel MD, MPH,   Internal Medicine Hospitalist   12/18/2020 9:07 AM

## 2020-12-18 NOTE — CONSULTS
[facility]  Psychiatry Consult    Reason for Consult: Concern paranoid, tangential    The primary source(s) of information include(s): Patient, patient's chart    The patient is a 23 y.o. female without previous psychiatric history, who has been admitted to medical services secondary to paranoid ideations, gerald and agitation. At time of initial evaluation in emergency department it was found that patient is positive for COVID-19 and she was admitted to medical services. Patient's chart has been reviewed. Patient has been seen today through the telehealth. Patient was wearing hospital attire and was walking in her room. Patient's nurse was sitting outside of the patient's room. Patient reported that previously she has been never diagnosed with any mental health illnesses, denies history of previous suicidal attempts, denies a history of being prescribed any psychotropic medications for her mental health'  During the interview patient stated that her family experiences concern about her behavior after she returned back from vacation from [facility]. However, patient stated \"it is not a big deal, I was just tired\". Patient endorses history of drinking alcohol, stated that she would prefer to drink tequila and she drinks at least 3 times a week. Patient denies history of blackouts, denies history of withdrawal seizures or DTs when she stopped drinking alcohol. She also reported history of smoking marijuana, stated that she smokes \"a quarter of the gram a day\", said that usually she smokes marijuana 2 - 3 times a day. Patient reported history of smoking tobacco in the past, denies smoking tobacco during the last a few years. During the interview patient significantly minimizes history of smoking marijuana and drinking alcohol, stated that she enjoyes to use both substances and does not see any issues with using those substances.   When I tried to educate patient about long term to internal stimuli. No overt psychosis. Executive Functions: Appear intact  Concentration: Fair  Reasoning: Appears mildly impaired based on interaction from interview   Orientation: to person, place, date, and situation. Alert. Language: Intact. Fund of information: Intact. Memory: recent and remote appear intact. Impulsivity: Slightly improved  Neurovegitative: Fair appetite, fair sleep  Insight: Limited  Judgment: Limited    Assessment  DSM 5 DIAGNOSIS:  Mood disorder, unspecified  Alcohol use disorder, severe  Cannabis use disorder, severe  Substance-induced mood disorder    Recommendations  1. Currently patient is not suicidal, homicidal or psychotic. Patient does not meet criteria for psychiatric hospitalization. 2. Patient does have a capacity of making her own medical decisions. 3. Patient does not present imminent danger to self or others. 4.  Patient declined possibility to consider any psychotropic medications for her mental health. .  5.  Patient can be discharged from the hospital by primary treatment team when she is medically stable  6. Psychiatry will sign off today.     Sylvie Parisi MD

## 2020-12-18 NOTE — DISCHARGE SUMMARY
Iris Morris  :  1995  MRN:  [id number]    Admit date:  2020  Discharge date:  2020       Admitting Physician:  Len Tao MD    Advance Directive: Full Code    Consults Made:   IP CONSULT TO SOCIAL WORK  PALLIATIVE CARE EVAL  IP CONSULT TO PSYCHIATRY      Primary Care Physician:  PADMINI Hernandez    Discharge Diagnoses: Active Problems:    COVID-19 virus infection  Resolved Problems:    * No resolved hospital problems. *        Pertinent Labs:  CBC with DIFF:  Recent Labs     20  1008   WBC 9.1   RBC 4.41   HGB 14.3   HCT 41.1   MCV 93.2   MCH 32.4*   MCHC 34.8   RDW 10.9*      MPV 9.1*       CMP/BMP:  Recent Labs     20  1008      K 3.6      CO2 26   ANIONGAP 12   GLUCOSE 101   BUN 7   CREATININE 0.7   LABGLOM >60   CALCIUM 10.0   PROT 8.9*   LABALBU 5.5*   BILITOT 0.7   ALKPHOS 71   ALT 19   AST 23         CRP:  No results for input(s): CRP in the last 72 hours. Sed Rate:  No results for input(s): SEDRATE in the last 72 hours. HgBA1c:  No components found for: HGBA1C  FLP:  No results found for: TRIG, HDL, LDLCALC, LDLDIRECT, LABVLDL  TSH:    Lab Results   Component Value Date    TSH 1.22 2016     Troponin T:   Recent Labs     20  1008   TROPONINI <0.01     Pro-BNP: No results for input(s): BNP in the last 72 hours. INR: No results for input(s): INR in the last 72 hours. ABGs: No results found for: PHART, PO2ART, GOX7DZR  UA:No results for input(s): NITRITE, COLORU, PHUR, LABCAST, WBCUA, RBCUA, MUCUS, TRICHOMONAS, YEAST, BACTERIA, CLARITYU, SPECGRAV, LEUKOCYTESUR, UROBILINOGEN, BILIRUBINUR, BLOODU, GLUCOSEU, AMORPHOUS in the last 72 hours. Invalid input(s): Zaid Delarosa      Culture Results:    No results for input(s): CXSURG in the last 720 hours. Blood Culture Recent: No results for input(s): BC in the last 720 hours. Cultures:   No results for input(s): CULTURE in the last 72 hours.   No results for input(s): BC, Suzanne Zacarias in the last 72 hours. No results for input(s): CXSURG in the last 72 hours. No results for input(s): MG, PHOS in the last 72 hours. Recent Labs     12/17/20  1008   AST 23   ALT 19   BILITOT 0.7   ALKPHOS 71           Significant Diagnostic Studies:   Ct Head Wo Contrast    Result Date: 12/17/2020  Examination. CT HEAD WO CONTRAST 12/17/2020 10:09 AM History: Altered mental status. DLP: 710 mGycm. The CT scan of the head is performed without intravenous contrast enhancement. The images are acquired in axial plane with subsequent reconstruction in coronal and sagittal planes. There is no previous study for comparison. There is no evidence of a mass. No midline shift. No intracranial hemorrhage or hematoma. The ventricles, the basal cisterns and the cortical sulci are normal. There is normal gray-white matter differentiation. The images reviewed in bone window show no acute bony abnormality. There is a mucous retention cyst located posteriorly in the left sphenoid sinus. The remaining paranasal sinuses and mastoid air cells are clear. A negative unenhanced CT scan of the head. Signed by Dr Jay Bertrand on 12/17/2020 12:19 PM      Hospital Course:   Ms Kya Hernández 25 y.o. female brought to the ER by EMS on account of behavioral change \"acting strange\", after turning from a recent trip to [facility] [place]note 2[place] with her family.     Patient reports inability to sleep, and reportedly gets lightheaded and numb each time she tries to sleep.     Patient was noted to be impulsive, agitated, hyperactive, paranoid, with rapid, pressured and tangential speech, upon presentation to the ED.       As per patient's family (mother) patient was reportedly in [facility] [place]note 2[place] with family on Saturday (12/12/2020).  Patient reportedly had excessive alcoholic intake, and subsequently started acting agitated and screaming at people, the next morning on Sunday.  Patient reportedly proved by Sunday afternoon.  The family reportedly returned a capacity of making her own medical decisions. 3. Patient does not present imminent danger to self or others. 4.  Patient declined possibility to consider any psychotropic medications for her mental health. .  5.  Patient can be discharged from the hospital by primary treatment team when she is medically stable  6. Psychiatry will sign off today. \"       Physical Exam:  Vital Signs: BP (!) 144/101   Pulse 114   Temp 98.4 °F (36.9 °C)   Resp 18   SpO2 100%   Physical Exam  Vitals signs and nursing note reviewed. Constitutional:       General: She is not in acute distress. Appearance: Normal appearance. She is not ill-appearing, toxic-appearing or diaphoretic. HENT:      Head: Normocephalic and atraumatic. Nose: Nose normal.   Neck:      Musculoskeletal: Normal range of motion and neck supple. Pulmonary:      Effort: Pulmonary effort is normal. No respiratory distress. Comments: Patient no acute respiratory distress. Speaking in full sentences and ambulating in room without any issues. Musculoskeletal: Normal range of motion. Neurological:      Mental Status: She is alert and oriented to person, place, and time. Motor: No weakness. Psychiatric:         Mood and Affect: Mood normal.         Behavior: Behavior normal.         Thought Content:  Thought content normal.         Judgment: Judgment normal.         Discharge Medications:       Jourdan Lucishelbi   Home Medication Instructions [facility]:[id number]    Printed on:12/18/20 1807   Medication Information                      ascorbic acid (VITAMIN C) 1000 MG tablet  Take 1 tablet by mouth daily             EPINEPHrine (EPIPEN 2-RIVAS) 0.3 MG/0.3ML SOAJ injection  Inject 0.3 mLs into the muscle once for 1 dose Use as directed for allergic reaction             hyoscyamine (OSCIMIN) 125 MCG TBDP dispersible tablet  Take 1 tablet by mouth 3 times daily as needed (bladder spasm)             TRI-SPRINTEC 0.18/0.215/0.25 MG-35 MCG TABS  TAKE 1 TABLET BY MOUTH ONCE DAILY             Vitamin D (CHOLECALCIFEROL) 50 MCG (2000 UT) TABS tablet  Take 1 tablet by mouth daily             zinc sulfate (ZINCATE) 220 (50 Zn) MG capsule  Take 1 capsule by mouth daily                 Discharge Instructions: Follow up with PADMINI Hopper in 7 days. Take medications as directed. Resume activity as tolerated. Diet: DIET GENERAL; Safety Tray; Safety Tray (Disposables)        DISCHARGE STATUS:    Condition: Good  Disposition: Patient is medically stable and will be discharged home    Extended Emergency Contact Information  Primary Emergency Contact: Michaela Grigsby  Address: [address] [facility] [facility], [address] [place] [address] Johnnie Bassett [address] Phone: [phone]  Relation: Parent  Secondary Emergency Contact: Mariah Knapp, [address] [place] [address] Phone: [phone]  Mobile Phone: [phone]  Relation: Grandparent       Time Spent on discharge is more than 32 mins in the examination, evaluation, counseling and review of medications and discharge plan. Electronically signed by   John Zamora MD, MPH,   Internal Medicine Hospitalist   12/18/2020 6:07 PM      Thank you PADMINI Hopper for the opportunity to be involved in this patient's care. If you have any questions or concerns please feel free to contact me at [phone]        EMR Dragon/Transcription disclaimer:   Much of this encounter note is an electronic transcription/translation of spoken language to printed text.  The electronic translation of spoken language may permit erroneous, or at times, nonsensical words or phrases to be inadvertently transcribed; although attempts have made to review the note for such errors, some may still exist.

## 2020-12-18 NOTE — CARE COORDINATION
Date / Time of Evaluation: 12/18/2020 10:04 AM  Assessment Completed by: Carmelo Brush    Patient Admission Status: Inpatient [101]      Current PCP:  PADMINI Wang  PCP verified? No    Initial Assessment Completed at bedside with:  patient    Emergency Contacts:  Extended Emergency Contact Information  Primary Emergency Contact: Michaela Grigsby  Address: [address], [address] [facility] [address]  Phone: [phone]  Relation: Parent  Secondary Emergency Contact: Meenakshi Manzano, [address] [place] [address] Phone: [phone]  Mobile Phone: [phone]  Relation: Grandparent    Advance Directives: Code Status:  Full Code    Financial:  Payor: /     Pre-Cert required for SNF:  No    Have Long Term Care Insurance:  N/A    Pharmacy:   [address], [address] [place] [address]  Phone: [phone] Fax: [phone]      Potential assistance purchasing medications? Pt may need assistance with d/c medications does not have insurance    ADLS:  Support System:  Lives w/boyciro, [address]:  House with boyfriend  New address: [address], [address]  Steps:  managing    Plans to RETURN to current housing: Yes  Barriers to RETURNING to current housing:  Medically stable    Currently ACTIVE with [address]:  No  [address]:  N/A    DME Provider:  N/A    Has a pulse oximetry unit at home: N/A    Had [address] [facility] prior to admission:  No  [address]:  N/A  Informed of need to bring portable home O2 tank to hospital on day of DISCHARGE:  N/A  Name of person committed to bringing portable tank at discharge:  N/A    Active with HD/PD prior to admission: No  Nephrologist:  N/A  HD Center:  N/A    Transition Plan:  home w/ boyfriend pta - psych following? - psych that takes COVID+?     Transportation PLAN for Discharge:  Family    Factors facilitating achievement of predicted outcomes: medically stable     Barriers to discharge:  stabilization of behaviors      Additional CM  Notes: CM spoke with patient this morning who states she feels \"fine/marvelous\"   Pt reports, she lives w/her boyfriend in [place], [place]. Pt states, she is not working presently but attending \"[employer]\" in [place], [place] to be an [facility]. Pt was pleasant and cooperative over the phone. CM will monitor for d/c needs. Psych consult pending. Yu Ch and/or her family were provided with choice of provider.         Roverto Plummer RN  [facility]  Care Management Department  Ph:  [phone]  Fax: [phone]  Electronically signed by Roverto Plummer RN on 12/18/2020 at 10:22 AM

## 2020-12-18 NOTE — ED NOTES
Report given and care transferred to [facility], RN  12/17/20 9963

## 2020-12-18 NOTE — PROGRESS NOTES
I spoke with Dr. Arsen Culp. Ok to discharge after he rounds on her and med rec is completed. Ok to discontinue sitter at bedside at this time.  Electronically signed by Fang Shea RN on 12/18/2020 at 10:40 AM

## 2020-12-19 ENCOUNTER — CARE COORDINATION (OUTPATIENT)
Dept: CASE MANAGEMENT | Age: 25
End: 2020-12-19

## 2020-12-21 ENCOUNTER — CARE COORDINATION (OUTPATIENT)
Dept: CASE MANAGEMENT | Age: 25
End: 2020-12-21

## 2020-12-21 LAB
EKG P AXIS: 40 DEGREES
EKG P-R INTERVAL: 102 MS
EKG Q-T INTERVAL: 350 MS
EKG QRS DURATION: 90 MS
EKG QTC CALCULATION (BAZETT): 400 MS
EKG T AXIS: -2 DEGREES

## 2020-12-21 NOTE — CARE COORDINATION
[facility] Transitions Initial Follow Up Call    Call within 2 business days of discharge: Yes    Patient: Viktor Alcala Patient : 1995   MRN: [id number]  Reason for Admission:   Discharge Date: 20 RARS: Readmission Risk Score: 9      Last Discharge [facility]       Complaint Diagnosis Description Type Department Provider    20 Mental Health Problem COVID-19 . .. ED to Hosp-Admission (Discharged) (ADMITTED) [facility] Tal Mitchell MD; Christine Delatorre . .. Spoke with: N/A    Facility: [place] [address]    Non-face-to-face services provided:  Chart review for COVID calls    Care Transitions 24 Hour Call    Care Transitions Interventions         Follow Up : Attempt #2 to make contact with Mukul Pablo initial follow up call post discharge from the hospital without success. Unable to leave a message regarding intent of call and call back information. Will try again at a later time.      Future Appointments   Date Time Provider Jerry Kelly   2020  1:15 PM Linde Rubinstein, [address] [place] [address]

## 2020-12-28 ENCOUNTER — CARE COORDINATION (OUTPATIENT)
Dept: CASE MANAGEMENT | Age: 25
End: 2020-12-28
